# Patient Record
Sex: FEMALE | Race: WHITE | NOT HISPANIC OR LATINO | URBAN - METROPOLITAN AREA
[De-identification: names, ages, dates, MRNs, and addresses within clinical notes are randomized per-mention and may not be internally consistent; named-entity substitution may affect disease eponyms.]

---

## 2017-01-09 ENCOUNTER — INPATIENT (INPATIENT)
Facility: HOSPITAL | Age: 67
LOS: 7 days | Discharge: HOME | DRG: 193 | End: 2017-01-17
Attending: GENERAL ACUTE CARE HOSPITAL | Admitting: GENERAL ACUTE CARE HOSPITAL
Payer: MEDICARE

## 2017-01-09 VITALS
DIASTOLIC BLOOD PRESSURE: 48 MMHG | TEMPERATURE: 99 F | RESPIRATION RATE: 22 BRPM | WEIGHT: 82.89 LBS | HEART RATE: 82 BPM | HEIGHT: 58 IN | SYSTOLIC BLOOD PRESSURE: 137 MMHG | OXYGEN SATURATION: 91 %

## 2017-01-09 DIAGNOSIS — Z98.89 OTHER SPECIFIED POSTPROCEDURAL STATES: Chronic | ICD-10-CM

## 2017-01-09 DIAGNOSIS — B19.20 UNSPECIFIED VIRAL HEPATITIS C WITHOUT HEPATIC COMA: ICD-10-CM

## 2017-01-09 DIAGNOSIS — R01.1 CARDIAC MURMUR, UNSPECIFIED: ICD-10-CM

## 2017-01-09 DIAGNOSIS — Z98.1 ARTHRODESIS STATUS: Chronic | ICD-10-CM

## 2017-01-09 DIAGNOSIS — E03.9 HYPOTHYROIDISM, UNSPECIFIED: ICD-10-CM

## 2017-01-09 DIAGNOSIS — Z93.0 TRACHEOSTOMY STATUS: Chronic | ICD-10-CM

## 2017-01-09 DIAGNOSIS — J96.90 RESPIRATORY FAILURE, UNSPECIFIED, UNSPECIFIED WHETHER WITH HYPOXIA OR HYPERCAPNIA: ICD-10-CM

## 2017-01-09 DIAGNOSIS — Z41.9 ENCOUNTER FOR PROCEDURE FOR PURPOSES OTHER THAN REMEDYING HEALTH STATE, UNSPECIFIED: Chronic | ICD-10-CM

## 2017-01-09 DIAGNOSIS — D64.9 ANEMIA, UNSPECIFIED: ICD-10-CM

## 2017-01-09 DIAGNOSIS — Z98.41 CATARACT EXTRACTION STATUS, RIGHT EYE: Chronic | ICD-10-CM

## 2017-01-09 DIAGNOSIS — J18.9 PNEUMONIA, UNSPECIFIED ORGANISM: ICD-10-CM

## 2017-01-09 LAB
ALBUMIN SERPL ELPH-MCNC: 4.2 G/DL — SIGNIFICANT CHANGE UP (ref 3.3–5)
ALP SERPL-CCNC: 75 U/L — SIGNIFICANT CHANGE UP (ref 40–120)
ALT FLD-CCNC: 14 U/L RC — SIGNIFICANT CHANGE UP (ref 10–45)
ANION GAP SERPL CALC-SCNC: 12 MMOL/L — SIGNIFICANT CHANGE UP (ref 5–17)
APPEARANCE UR: CLEAR — SIGNIFICANT CHANGE UP
AST SERPL-CCNC: 20 U/L — SIGNIFICANT CHANGE UP (ref 10–40)
BASOPHILS # BLD AUTO: 0 K/UL — SIGNIFICANT CHANGE UP (ref 0–0.2)
BASOPHILS NFR BLD AUTO: 0.4 % — SIGNIFICANT CHANGE UP (ref 0–2)
BILIRUB SERPL-MCNC: 0.3 MG/DL — SIGNIFICANT CHANGE UP (ref 0.2–1.2)
BILIRUB UR-MCNC: NEGATIVE — SIGNIFICANT CHANGE UP
BUN SERPL-MCNC: 15 MG/DL — SIGNIFICANT CHANGE UP (ref 7–23)
CALCIUM SERPL-MCNC: 10.5 MG/DL — SIGNIFICANT CHANGE UP (ref 8.4–10.5)
CHLORIDE SERPL-SCNC: 94 MMOL/L — LOW (ref 96–108)
CO2 SERPL-SCNC: 37 MMOL/L — HIGH (ref 22–31)
COLOR SPEC: YELLOW — SIGNIFICANT CHANGE UP
CREAT SERPL-MCNC: 0.5 MG/DL — SIGNIFICANT CHANGE UP (ref 0.5–1.3)
DIFF PNL FLD: NEGATIVE — SIGNIFICANT CHANGE UP
EOSINOPHIL # BLD AUTO: 0.1 K/UL — SIGNIFICANT CHANGE UP (ref 0–0.5)
EOSINOPHIL NFR BLD AUTO: 1.2 % — SIGNIFICANT CHANGE UP (ref 0–6)
GAS PNL BLDV: SIGNIFICANT CHANGE UP
GLUCOSE SERPL-MCNC: 105 MG/DL — HIGH (ref 70–99)
GLUCOSE UR QL: NEGATIVE — SIGNIFICANT CHANGE UP
HCT VFR BLD CALC: 33.4 % — LOW (ref 34.5–45)
HGB BLD-MCNC: 10.8 G/DL — LOW (ref 11.5–15.5)
KETONES UR-MCNC: NEGATIVE — SIGNIFICANT CHANGE UP
LEUKOCYTE ESTERASE UR-ACNC: NEGATIVE — SIGNIFICANT CHANGE UP
LYMPHOCYTES # BLD AUTO: 0.9 K/UL — LOW (ref 1–3.3)
LYMPHOCYTES # BLD AUTO: 8.4 % — LOW (ref 13–44)
MCHC RBC-ENTMCNC: 32.5 GM/DL — SIGNIFICANT CHANGE UP (ref 32–36)
MCHC RBC-ENTMCNC: 33.1 PG — SIGNIFICANT CHANGE UP (ref 27–34)
MCV RBC AUTO: 102 FL — HIGH (ref 80–100)
MONOCYTES # BLD AUTO: 0.9 K/UL — SIGNIFICANT CHANGE UP (ref 0–0.9)
MONOCYTES NFR BLD AUTO: 9.2 % — SIGNIFICANT CHANGE UP (ref 2–14)
NEUTROPHILS # BLD AUTO: 8.3 K/UL — HIGH (ref 1.8–7.4)
NEUTROPHILS NFR BLD AUTO: 80.9 % — HIGH (ref 43–77)
NITRITE UR-MCNC: NEGATIVE — SIGNIFICANT CHANGE UP
PH UR: 8.5 — HIGH (ref 4.8–8)
PLATELET # BLD AUTO: 229 K/UL — SIGNIFICANT CHANGE UP (ref 150–400)
POTASSIUM SERPL-MCNC: 4.3 MMOL/L — SIGNIFICANT CHANGE UP (ref 3.5–5.3)
POTASSIUM SERPL-SCNC: 4.3 MMOL/L — SIGNIFICANT CHANGE UP (ref 3.5–5.3)
PROT SERPL-MCNC: 8.1 G/DL — SIGNIFICANT CHANGE UP (ref 6–8.3)
PROT UR-MCNC: 30 MG/DL
RBC # BLD: 3.27 M/UL — LOW (ref 3.8–5.2)
RBC # FLD: 11.5 % — SIGNIFICANT CHANGE UP (ref 10.3–14.5)
RBC CASTS # UR COMP ASSIST: SIGNIFICANT CHANGE UP /HPF (ref 0–2)
SODIUM SERPL-SCNC: 143 MMOL/L — SIGNIFICANT CHANGE UP (ref 135–145)
SP GR SPEC: 1.02 — SIGNIFICANT CHANGE UP (ref 1.01–1.02)
UROBILINOGEN FLD QL: 1
WBC # BLD: 10.3 K/UL — SIGNIFICANT CHANGE UP (ref 3.8–10.5)
WBC # FLD AUTO: 10.3 K/UL — SIGNIFICANT CHANGE UP (ref 3.8–10.5)
WBC UR QL: SIGNIFICANT CHANGE UP /HPF (ref 0–5)

## 2017-01-09 PROCEDURE — 71250 CT THORAX DX C-: CPT | Mod: 26

## 2017-01-09 PROCEDURE — 99285 EMERGENCY DEPT VISIT HI MDM: CPT | Mod: GC

## 2017-01-09 RX ORDER — BRIMONIDINE TARTRATE 2 MG/MG
1 SOLUTION/ DROPS OPHTHALMIC
Qty: 0 | Refills: 0 | Status: DISCONTINUED | OUTPATIENT
Start: 2017-01-09 | End: 2017-01-17

## 2017-01-09 RX ORDER — HEPARIN SODIUM 5000 [USP'U]/ML
5000 INJECTION INTRAVENOUS; SUBCUTANEOUS EVERY 8 HOURS
Qty: 0 | Refills: 0 | Status: DISCONTINUED | OUTPATIENT
Start: 2017-01-09 | End: 2017-01-13

## 2017-01-09 RX ORDER — BUDESONIDE, MICRONIZED 100 %
0.5 POWDER (GRAM) MISCELLANEOUS EVERY 12 HOURS
Qty: 0 | Refills: 0 | Status: DISCONTINUED | OUTPATIENT
Start: 2017-01-09 | End: 2017-01-17

## 2017-01-09 RX ORDER — LEVOTHYROXINE SODIUM 125 MCG
50 TABLET ORAL DAILY
Qty: 0 | Refills: 0 | Status: DISCONTINUED | OUTPATIENT
Start: 2017-01-09 | End: 2017-01-17

## 2017-01-09 RX ORDER — LATANOPROST 0.05 MG/ML
1 SOLUTION/ DROPS OPHTHALMIC; TOPICAL AT BEDTIME
Qty: 0 | Refills: 0 | Status: DISCONTINUED | OUTPATIENT
Start: 2017-01-09 | End: 2017-01-17

## 2017-01-09 RX ORDER — IPRATROPIUM/ALBUTEROL SULFATE 18-103MCG
3 AEROSOL WITH ADAPTER (GRAM) INHALATION EVERY 6 HOURS
Qty: 0 | Refills: 0 | Status: DISCONTINUED | OUTPATIENT
Start: 2017-01-09 | End: 2017-01-13

## 2017-01-09 RX ADMIN — Medication 3 MILLILITER(S): at 19:20

## 2017-01-09 NOTE — ED ADULT NURSE NOTE - PSH
H/O eye surgery  multiple for cataract and vitrectomy  H/O tracheostomy  in for about 1 month 2009 after fisrt respiratory failure  History of spinal fusion  as a child for scoliosis  S/P cataract surgery, right  2014  S/P Laparotomy  for colon cancer 2008  Surgery, other elective  multiple surgeries orthopedic as a child, due to scoliosis and club feet, and tendon surgeries all as a child

## 2017-01-09 NOTE — H&P ADULT. - PROBLEM SELECTOR PLAN 4
pt with hx of Colon ca and last colonoscopy was three yrs ago and was normal per pt   will monitor and check anemia w/u

## 2017-01-09 NOTE — ED ADULT NURSE NOTE - PMH
Adult Hypothyroidism    AF (Paroxysmal Atrial Fibrillation)  on episode, no treatment  Arthrogryposis multiplex congenita    Cataract    Colon cancer  dx 2009  Congenital Scoliosis    COPD (Chronic Obstructive Pulmonary Disease)  restrictive secondary to scoliosis  Hepatitis C  not yet been treated,treated 2015  Osteoporosis    Pneumonia  MRSA 2010  Respiratory failure  was on a Vent for 1 month in 2009  Scoliosis

## 2017-01-09 NOTE — ED PROVIDER NOTE - OBJECTIVE STATEMENT
66F with restrictive lung disease, scoliosis, chronic O2 use (unclear reason), who presents with 1 week of SOB, increased work of breathing, and chest discomfort. No fevers/night sweats. Patient visited her Pulmonologist today (Dr. Bass), who sent her in for further evaluation.

## 2017-01-09 NOTE — ED PROVIDER NOTE - MEDICAL DECISION MAKING DETAILS
1) Labs 2) CT chest 3) IV abx as needed 4) DMIT TO ITA 66F with restrictive lung disease a/w increased work of breathing. 1) Labs 2) CT chest 3) IV abx as needed 4) DMIT TO ITA

## 2017-01-09 NOTE — H&P ADULT. - ASSESSMENT
65 y/o female with hx of colon ca s/p J pouch , hypothyroid , arthyrogryprosis,  restrictive lung diease sec to kyphoscolosis chronically on home nocturnal o2 but occasionally during day  , hitory of respiratory failure in past s/p tach and peg . pt now admitted with worsening sob and cough / back pain and increasing use of O2 to needing it during dya  . no fever or chills   CXR done as o/p suggestive of pna   pt was seen in ER by Dr. Brooks from pul :  acute on chronic respiratory failure ( sec to restrictive lung diseae ) with decompensation  sec to PNA

## 2017-01-09 NOTE — H&P ADULT. - PMH
Adult Hypothyroidism    AF (Paroxysmal Atrial Fibrillation)  on episode, no treatment  Arthrogryposis multiplex congenita    Cataract    Colon cancer  dx 2009  Congenital Scoliosis    COPD (Chronic Obstructive Pulmonary Disease)  restrictive secondary to scoliosis  Hepatitis C  not yet been treated,treated 2015  Osteoporosis    Pneumonia  MRSA 2010  Respiratory failure  was on a Vent for 1 month in 2009  Scoliosis Adult Hypothyroidism    Arthrogryposis multiplex congenita    Cataract    Colon cancer  dx 2009  Congenital Scoliosis    COPD (Chronic Obstructive Pulmonary Disease)  restrictive secondary to scoliosis  Osteoporosis    Pneumonia  MRSA 2010  Respiratory failure  was on a Vent for 1 month in 2009  Scoliosis

## 2017-01-09 NOTE — H&P ADULT. - PROBLEM SELECTOR PLAN 1
acute on chronic respiratory failure ( sec to restrictive lung diseae ) with decompensation  sec to PNA      given one dose of Levaquin .. will await ID input given multiple allergies   pt unsure if allergies documented are true allergies

## 2017-01-09 NOTE — ED ADULT NURSE NOTE - OBJECTIVE STATEMENT
pt is a 66 yr F sent by PMD for PNA, pt has been having worsening SOB and cough since Saturday. denies fever/chills pt is a 66 yr F sent by PMD for PNA, pt has been having worsening SOB and cough since Saturday. denies fever/chills/cp. 90% on 2L NC, 95% on 3L. no acute distress.

## 2017-01-09 NOTE — ED PROVIDER NOTE - PLAN OF CARE
Undergo further work-up and management - patient to be admitted to Dr. Moreland  - continue with Moustapha

## 2017-01-09 NOTE — ED PROVIDER NOTE - CARE PLAN
Principal Discharge DX:	Community acquired pneumonia  Goal:	Undergo further work-up and management  Instructions for follow-up, activity and diet:	- patient to be admitted to Dr. Moreland  - continue with Moustapha

## 2017-01-10 LAB
ALBUMIN SERPL ELPH-MCNC: 3.7 G/DL — SIGNIFICANT CHANGE UP (ref 3.3–5)
ALP SERPL-CCNC: 69 U/L — SIGNIFICANT CHANGE UP (ref 40–120)
ALT FLD-CCNC: 19 U/L — SIGNIFICANT CHANGE UP (ref 10–45)
ANION GAP SERPL CALC-SCNC: 12 MMOL/L — SIGNIFICANT CHANGE UP (ref 5–17)
AST SERPL-CCNC: 23 U/L — SIGNIFICANT CHANGE UP (ref 10–40)
BILIRUB SERPL-MCNC: 0.2 MG/DL — SIGNIFICANT CHANGE UP (ref 0.2–1.2)
BUN SERPL-MCNC: 16 MG/DL — SIGNIFICANT CHANGE UP (ref 7–23)
CALCIUM SERPL-MCNC: 9.9 MG/DL — SIGNIFICANT CHANGE UP (ref 8.4–10.5)
CHLORIDE SERPL-SCNC: 93 MMOL/L — LOW (ref 96–108)
CO2 SERPL-SCNC: 36 MMOL/L — HIGH (ref 22–31)
CREAT SERPL-MCNC: 0.42 MG/DL — LOW (ref 0.5–1.3)
FERRITIN SERPL-MCNC: 185.7 NG/ML — HIGH (ref 15–150)
FOLATE SERPL-MCNC: >20 NG/ML — SIGNIFICANT CHANGE UP (ref 4.8–24.2)
GLUCOSE SERPL-MCNC: 103 MG/DL — HIGH (ref 70–99)
IRON SATN MFR SERPL: 15 % — SIGNIFICANT CHANGE UP (ref 14–50)
IRON SATN MFR SERPL: 33 UG/DL — SIGNIFICANT CHANGE UP (ref 30–160)
LEGIONELLA AG UR QL: NEGATIVE — SIGNIFICANT CHANGE UP
POTASSIUM SERPL-MCNC: 5.3 MMOL/L — SIGNIFICANT CHANGE UP (ref 3.5–5.3)
POTASSIUM SERPL-SCNC: 5.3 MMOL/L — SIGNIFICANT CHANGE UP (ref 3.5–5.3)
PROT SERPL-MCNC: 7.4 G/DL — SIGNIFICANT CHANGE UP (ref 6–8.3)
RAPID RVP RESULT: SIGNIFICANT CHANGE UP
SODIUM SERPL-SCNC: 141 MMOL/L — SIGNIFICANT CHANGE UP (ref 135–145)
T4 FREE SERPL-MCNC: 1.2 NG/DL — SIGNIFICANT CHANGE UP (ref 0.9–1.8)
TIBC SERPL-MCNC: 217 UG/DL — LOW (ref 220–430)
TSH SERPL-MCNC: 2.39 UU/ML — SIGNIFICANT CHANGE UP (ref 0.27–4.2)
UIBC SERPL-MCNC: 184 UG/DL — SIGNIFICANT CHANGE UP (ref 110–370)
VIT B12 SERPL-MCNC: 296 PG/ML — SIGNIFICANT CHANGE UP (ref 243–894)

## 2017-01-10 PROCEDURE — 99222 1ST HOSP IP/OBS MODERATE 55: CPT

## 2017-01-10 RX ORDER — IBUPROFEN 200 MG
400 TABLET ORAL ONCE
Qty: 0 | Refills: 0 | Status: COMPLETED | OUTPATIENT
Start: 2017-01-10 | End: 2017-01-10

## 2017-01-10 RX ADMIN — HEPARIN SODIUM 5000 UNIT(S): 5000 INJECTION INTRAVENOUS; SUBCUTANEOUS at 22:19

## 2017-01-10 RX ADMIN — HEPARIN SODIUM 5000 UNIT(S): 5000 INJECTION INTRAVENOUS; SUBCUTANEOUS at 05:03

## 2017-01-10 RX ADMIN — Medication 0.5 MILLIGRAM(S): at 17:34

## 2017-01-10 RX ADMIN — BRIMONIDINE TARTRATE 1 DROP(S): 2 SOLUTION/ DROPS OPHTHALMIC at 17:34

## 2017-01-10 RX ADMIN — Medication 3 MILLILITER(S): at 17:33

## 2017-01-10 RX ADMIN — Medication 3 MILLILITER(S): at 00:54

## 2017-01-10 RX ADMIN — Medication 400 MILLIGRAM(S): at 14:06

## 2017-01-10 RX ADMIN — LATANOPROST 1 DROP(S): 0.05 SOLUTION/ DROPS OPHTHALMIC; TOPICAL at 22:20

## 2017-01-10 RX ADMIN — Medication 3 MILLILITER(S): at 23:22

## 2017-01-10 RX ADMIN — Medication 1 TABLET(S): at 11:24

## 2017-01-10 RX ADMIN — Medication 50 MICROGRAM(S): at 05:03

## 2017-01-10 RX ADMIN — Medication 400 MILLIGRAM(S): at 13:36

## 2017-01-10 RX ADMIN — Medication 3 MILLILITER(S): at 11:24

## 2017-01-10 RX ADMIN — Medication 3 MILLILITER(S): at 05:03

## 2017-01-10 RX ADMIN — BRIMONIDINE TARTRATE 1 DROP(S): 2 SOLUTION/ DROPS OPHTHALMIC at 05:09

## 2017-01-10 RX ADMIN — Medication 0.5 MILLIGRAM(S): at 11:25

## 2017-01-11 LAB
ANION GAP SERPL CALC-SCNC: 10 MMOL/L — SIGNIFICANT CHANGE UP (ref 5–17)
BUN SERPL-MCNC: 12 MG/DL — SIGNIFICANT CHANGE UP (ref 7–23)
CALCIUM SERPL-MCNC: 9.8 MG/DL — SIGNIFICANT CHANGE UP (ref 8.4–10.5)
CHLORIDE SERPL-SCNC: 94 MMOL/L — LOW (ref 96–108)
CK MB BLD-MCNC: 5.4 % — HIGH (ref 0–3.5)
CK MB BLD-MCNC: 5.8 % — HIGH (ref 0–3.5)
CK MB CFR SERPL CALC: 1.5 NG/ML — SIGNIFICANT CHANGE UP (ref 0–3.8)
CK MB CFR SERPL CALC: 1.5 NG/ML — SIGNIFICANT CHANGE UP (ref 0–3.8)
CK SERPL-CCNC: 26 U/L — SIGNIFICANT CHANGE UP (ref 25–170)
CK SERPL-CCNC: 28 U/L — SIGNIFICANT CHANGE UP (ref 25–170)
CO2 SERPL-SCNC: 38 MMOL/L — HIGH (ref 22–31)
CREAT SERPL-MCNC: 0.41 MG/DL — LOW (ref 0.5–1.3)
GLUCOSE SERPL-MCNC: 121 MG/DL — HIGH (ref 70–99)
HCT VFR BLD CALC: 33.2 % — LOW (ref 34.5–45)
HGB BLD-MCNC: 10 G/DL — LOW (ref 11.5–15.5)
MCHC RBC-ENTMCNC: 30.1 GM/DL — LOW (ref 32–36)
MCHC RBC-ENTMCNC: 31.2 PG — SIGNIFICANT CHANGE UP (ref 27–34)
MCV RBC AUTO: 103.4 FL — HIGH (ref 80–100)
PLATELET # BLD AUTO: 255 K/UL — SIGNIFICANT CHANGE UP (ref 150–400)
POTASSIUM SERPL-MCNC: 4.5 MMOL/L — SIGNIFICANT CHANGE UP (ref 3.5–5.3)
POTASSIUM SERPL-SCNC: 4.5 MMOL/L — SIGNIFICANT CHANGE UP (ref 3.5–5.3)
RBC # BLD: 3.21 M/UL — LOW (ref 3.8–5.2)
RBC # FLD: 12.7 % — SIGNIFICANT CHANGE UP (ref 10.3–14.5)
SODIUM SERPL-SCNC: 142 MMOL/L — SIGNIFICANT CHANGE UP (ref 135–145)
TROPONIN T SERPL-MCNC: <0.01 NG/ML — SIGNIFICANT CHANGE UP (ref 0–0.06)
TROPONIN T SERPL-MCNC: <0.01 NG/ML — SIGNIFICANT CHANGE UP (ref 0–0.06)
WBC # BLD: 8.09 K/UL — SIGNIFICANT CHANGE UP (ref 3.8–10.5)
WBC # FLD AUTO: 8.09 K/UL — SIGNIFICANT CHANGE UP (ref 3.8–10.5)

## 2017-01-11 PROCEDURE — 93010 ELECTROCARDIOGRAM REPORT: CPT | Mod: 76

## 2017-01-11 PROCEDURE — 93306 TTE W/DOPPLER COMPLETE: CPT | Mod: 26

## 2017-01-11 PROCEDURE — 99232 SBSQ HOSP IP/OBS MODERATE 35: CPT

## 2017-01-11 PROCEDURE — 99223 1ST HOSP IP/OBS HIGH 75: CPT

## 2017-01-11 RX ORDER — ASPIRIN/CALCIUM CARB/MAGNESIUM 324 MG
81 TABLET ORAL DAILY
Qty: 0 | Refills: 0 | Status: DISCONTINUED | OUTPATIENT
Start: 2017-01-11 | End: 2017-01-13

## 2017-01-11 RX ORDER — METOPROLOL TARTRATE 50 MG
25 TABLET ORAL
Qty: 0 | Refills: 0 | Status: DISCONTINUED | OUTPATIENT
Start: 2017-01-11 | End: 2017-01-11

## 2017-01-11 RX ORDER — IBUPROFEN 200 MG
400 TABLET ORAL ONCE
Qty: 0 | Refills: 0 | Status: COMPLETED | OUTPATIENT
Start: 2017-01-11 | End: 2017-01-11

## 2017-01-11 RX ORDER — METOPROLOL TARTRATE 50 MG
2.5 TABLET ORAL ONCE
Qty: 0 | Refills: 0 | Status: COMPLETED | OUTPATIENT
Start: 2017-01-11 | End: 2017-01-11

## 2017-01-11 RX ADMIN — LATANOPROST 1 DROP(S): 0.05 SOLUTION/ DROPS OPHTHALMIC; TOPICAL at 21:01

## 2017-01-11 RX ADMIN — HEPARIN SODIUM 5000 UNIT(S): 5000 INJECTION INTRAVENOUS; SUBCUTANEOUS at 08:44

## 2017-01-11 RX ADMIN — Medication 400 MILLIGRAM(S): at 02:26

## 2017-01-11 RX ADMIN — Medication 2.5 MILLIGRAM(S): at 05:54

## 2017-01-11 RX ADMIN — Medication 0.5 MILLIGRAM(S): at 18:22

## 2017-01-11 RX ADMIN — Medication 3 MILLILITER(S): at 05:55

## 2017-01-11 RX ADMIN — HEPARIN SODIUM 5000 UNIT(S): 5000 INJECTION INTRAVENOUS; SUBCUTANEOUS at 13:18

## 2017-01-11 RX ADMIN — Medication 50 MICROGRAM(S): at 08:40

## 2017-01-11 RX ADMIN — Medication 3 MILLILITER(S): at 13:10

## 2017-01-11 RX ADMIN — Medication 1 TABLET(S): at 13:18

## 2017-01-11 RX ADMIN — BRIMONIDINE TARTRATE 1 DROP(S): 2 SOLUTION/ DROPS OPHTHALMIC at 08:45

## 2017-01-11 RX ADMIN — Medication 25 MILLIGRAM(S): at 18:23

## 2017-01-11 RX ADMIN — Medication 400 MILLIGRAM(S): at 02:28

## 2017-01-11 RX ADMIN — Medication 0.5 MILLIGRAM(S): at 05:54

## 2017-01-11 RX ADMIN — Medication 3 MILLILITER(S): at 18:21

## 2017-01-11 RX ADMIN — Medication 81 MILLIGRAM(S): at 13:18

## 2017-01-11 RX ADMIN — HEPARIN SODIUM 5000 UNIT(S): 5000 INJECTION INTRAVENOUS; SUBCUTANEOUS at 21:01

## 2017-01-11 RX ADMIN — BRIMONIDINE TARTRATE 1 DROP(S): 2 SOLUTION/ DROPS OPHTHALMIC at 18:23

## 2017-01-11 NOTE — PROVIDER CONTACT NOTE (CHANGE IN STATUS NOTIFICATION) - SITUATION
Called kanika Malagon for patient with complaints of chest tightness ,sob , elevated hr 147 , Called kanika Malagon for patient with complaints of chest tightness ,sob , elevated hr 147 , mews score of 8

## 2017-01-11 NOTE — PROVIDER CONTACT NOTE (CHANGE IN STATUS NOTIFICATION) - ACTION/TREATMENT ORDERED:
kanika Malagon assessed patient administered lopressor  2.5 mg ivp /1 , patient pending transfer to telemetry  as per kanika Malagon instructions

## 2017-01-12 LAB
CK MB BLD-MCNC: 7 % — HIGH (ref 0–3.5)
CK MB CFR SERPL CALC: 1.4 NG/ML — SIGNIFICANT CHANGE UP (ref 0–3.8)
CK SERPL-CCNC: 20 U/L — LOW (ref 25–170)
TROPONIN T SERPL-MCNC: <0.01 NG/ML — SIGNIFICANT CHANGE UP (ref 0–0.06)

## 2017-01-12 PROCEDURE — 99233 SBSQ HOSP IP/OBS HIGH 50: CPT

## 2017-01-12 PROCEDURE — 99232 SBSQ HOSP IP/OBS MODERATE 35: CPT

## 2017-01-12 RX ADMIN — BRIMONIDINE TARTRATE 1 DROP(S): 2 SOLUTION/ DROPS OPHTHALMIC at 17:48

## 2017-01-12 RX ADMIN — Medication 1 DROP(S): at 20:37

## 2017-01-12 RX ADMIN — Medication 1 DROP(S): at 17:48

## 2017-01-12 RX ADMIN — Medication 1 TABLET(S): at 11:52

## 2017-01-12 RX ADMIN — Medication 3 MILLILITER(S): at 11:52

## 2017-01-12 RX ADMIN — HEPARIN SODIUM 5000 UNIT(S): 5000 INJECTION INTRAVENOUS; SUBCUTANEOUS at 14:47

## 2017-01-12 RX ADMIN — Medication 3 MILLILITER(S): at 00:58

## 2017-01-12 RX ADMIN — Medication 1 DROP(S): at 22:38

## 2017-01-12 RX ADMIN — Medication 0.5 MILLIGRAM(S): at 17:48

## 2017-01-12 RX ADMIN — Medication 0.5 MILLIGRAM(S): at 06:03

## 2017-01-12 RX ADMIN — Medication 3 MILLILITER(S): at 06:00

## 2017-01-12 RX ADMIN — BRIMONIDINE TARTRATE 1 DROP(S): 2 SOLUTION/ DROPS OPHTHALMIC at 06:00

## 2017-01-12 RX ADMIN — Medication 3 MILLILITER(S): at 17:48

## 2017-01-12 RX ADMIN — HEPARIN SODIUM 5000 UNIT(S): 5000 INJECTION INTRAVENOUS; SUBCUTANEOUS at 22:38

## 2017-01-12 RX ADMIN — LATANOPROST 1 DROP(S): 0.05 SOLUTION/ DROPS OPHTHALMIC; TOPICAL at 21:38

## 2017-01-12 RX ADMIN — HEPARIN SODIUM 5000 UNIT(S): 5000 INJECTION INTRAVENOUS; SUBCUTANEOUS at 06:00

## 2017-01-12 RX ADMIN — Medication 50 MICROGRAM(S): at 06:02

## 2017-01-12 RX ADMIN — Medication 81 MILLIGRAM(S): at 11:52

## 2017-01-12 RX ADMIN — Medication 3 MILLILITER(S): at 22:39

## 2017-01-12 RX ADMIN — Medication 1 DROP(S): at 14:47

## 2017-01-12 NOTE — DIETITIAN INITIAL EVALUATION ADULT. - NS AS NUTRI INTERV ED CONTENT
Other (specify)/Reviewed Increased nutrient need with Pt. Encouraged Pt to consume nutrient dense meals/ snacks, small frequent meals/ snacks. Pt verbalized understanding.

## 2017-01-12 NOTE — DIETITIAN INITIAL EVALUATION ADULT. - OTHER INFO
Pt seen fr BMI <19. Pt reports a good PO intake during admission; denies chewing/swallowing difficulty. Pt reports PTA she eats well, however of the past 2 weeks she hasn't been feeling well and therefore was eating ~50% of her usual intake. Pt is currently 81.1lbs. Pt states she has always been thin but has noticed a thinner appearance in the last 2 weeks. Pt refusing Ensure products at this time but is amenable to trying No carb Pro source. Pt offer foods preferences and is requested a lactose free diet. Pt denies GI distress at this time; states stool in J Pouch is normal. Pt takes MVI. Noted allergy to honey.

## 2017-01-12 NOTE — DIETITIAN INITIAL EVALUATION ADULT. - NS AS NUTRI INTERV COLLABORAT
Collaboration with other providers/Malnutrition sticker placed in chart, Team aware. RD remains available to monitor PO intake, wt, labs and intake of PO supplement.

## 2017-01-12 NOTE — DIETITIAN INITIAL EVALUATION ADULT. - PHYSICAL APPEARANCE
emaciated/Nutrition physical focused exam: Severe muscle wasting to temporals, clavicles, deltoids, scapulas, Interosseous region, thigh/calf. Severe fat loss to orbitals ribs and triceps.

## 2017-01-12 NOTE — DIETITIAN INITIAL EVALUATION ADULT. - NS AS NUTRI INTERV FEED ASSISTANCE
1. Provide food preferences as requested by Pt/family within diet restrictions  2. Encourage PO intake during meal times 3. Reviewed menu ordering procedures/Other (specify)

## 2017-01-12 NOTE — DIETITIAN INITIAL EVALUATION ADULT. - ORAL INTAKE PTA
Pt reports a good appetite PTA. Breakfast: cereal, oatmeal, eggs on weekens, or bagel with cheese. Lunch: tuna or egg salad. Lactaid free yogurt. Dinner: baked chicken, roasted vegetables, egg noodles, sweet potato or rice. Drinks: water and coffee./good

## 2017-01-12 NOTE — DIETITIAN INITIAL EVALUATION ADULT. - NUTRITION INTERVENTION
Feeding Assistance/Medical Food Supplements/Meals and Snack/Collaboration and Referral of Nutrition Care/Nutrition Education

## 2017-01-12 NOTE — DIETITIAN INITIAL EVALUATION ADULT. - ENERGY NEEDS
Ht: 4'10", Wt: 81.1lbs, BMI: 16.9kg/m2, IBW: 96lbs(+/-10%), 84%IBW  Pertinent information: Pt admitted for worsening hypoxia. Pt with restrictive lung Dz due to Kyphoscoliosis. per chart, CT chest shows PNA, and new on set AFib.   +1 right foot Edema. No Skin breakdown noted at this time.

## 2017-01-13 LAB
ALBUMIN SERPL ELPH-MCNC: 3.6 G/DL — SIGNIFICANT CHANGE UP (ref 3.3–5)
ALP SERPL-CCNC: 77 U/L — SIGNIFICANT CHANGE UP (ref 40–120)
ALT FLD-CCNC: 24 U/L RC — SIGNIFICANT CHANGE UP (ref 10–45)
ANION GAP SERPL CALC-SCNC: 10 MMOL/L — SIGNIFICANT CHANGE UP (ref 5–17)
ANION GAP SERPL CALC-SCNC: 11 MMOL/L — SIGNIFICANT CHANGE UP (ref 5–17)
AST SERPL-CCNC: 33 U/L — SIGNIFICANT CHANGE UP (ref 10–40)
BASOPHILS # BLD AUTO: 0 K/UL — SIGNIFICANT CHANGE UP (ref 0–0.2)
BASOPHILS NFR BLD AUTO: 0.2 % — SIGNIFICANT CHANGE UP (ref 0–2)
BILIRUB SERPL-MCNC: 0.1 MG/DL — LOW (ref 0.2–1.2)
BUN SERPL-MCNC: 19 MG/DL — SIGNIFICANT CHANGE UP (ref 7–23)
BUN SERPL-MCNC: 22 MG/DL — SIGNIFICANT CHANGE UP (ref 7–23)
CALCIUM SERPL-MCNC: 10.1 MG/DL — SIGNIFICANT CHANGE UP (ref 8.4–10.5)
CALCIUM SERPL-MCNC: 8.9 MG/DL — SIGNIFICANT CHANGE UP (ref 8.4–10.5)
CHLORIDE SERPL-SCNC: 100 MMOL/L — SIGNIFICANT CHANGE UP (ref 96–108)
CHLORIDE SERPL-SCNC: 94 MMOL/L — LOW (ref 96–108)
CO2 SERPL-SCNC: 33 MMOL/L — HIGH (ref 22–31)
CO2 SERPL-SCNC: 38 MMOL/L — HIGH (ref 22–31)
CREAT SERPL-MCNC: 0.36 MG/DL — LOW (ref 0.5–1.3)
CREAT SERPL-MCNC: 0.47 MG/DL — LOW (ref 0.5–1.3)
EOSINOPHIL # BLD AUTO: 0.1 K/UL — SIGNIFICANT CHANGE UP (ref 0–0.5)
EOSINOPHIL NFR BLD AUTO: 1.6 % — SIGNIFICANT CHANGE UP (ref 0–6)
GAS PNL BLDA: SIGNIFICANT CHANGE UP
GLUCOSE SERPL-MCNC: 111 MG/DL — HIGH (ref 70–99)
GLUCOSE SERPL-MCNC: 117 MG/DL — HIGH (ref 70–99)
HCT VFR BLD CALC: 32.4 % — LOW (ref 34.5–45)
HGB BLD-MCNC: 10.5 G/DL — LOW (ref 11.5–15.5)
LYMPHOCYTES # BLD AUTO: 1.3 K/UL — SIGNIFICANT CHANGE UP (ref 1–3.3)
LYMPHOCYTES # BLD AUTO: 14.5 % — SIGNIFICANT CHANGE UP (ref 13–44)
MAGNESIUM SERPL-MCNC: 1.9 MG/DL — SIGNIFICANT CHANGE UP (ref 1.6–2.6)
MCHC RBC-ENTMCNC: 32.4 GM/DL — SIGNIFICANT CHANGE UP (ref 32–36)
MCHC RBC-ENTMCNC: 33.7 PG — SIGNIFICANT CHANGE UP (ref 27–34)
MCV RBC AUTO: 104 FL — HIGH (ref 80–100)
MONOCYTES # BLD AUTO: 1.1 K/UL — HIGH (ref 0–0.9)
MONOCYTES NFR BLD AUTO: 12.2 % — SIGNIFICANT CHANGE UP (ref 2–14)
NEUTROPHILS # BLD AUTO: 6.6 K/UL — SIGNIFICANT CHANGE UP (ref 1.8–7.4)
NEUTROPHILS NFR BLD AUTO: 71.6 % — SIGNIFICANT CHANGE UP (ref 43–77)
PHOSPHATE SERPL-MCNC: 3.9 MG/DL — SIGNIFICANT CHANGE UP (ref 2.5–4.5)
PLATELET # BLD AUTO: 240 K/UL — SIGNIFICANT CHANGE UP (ref 150–400)
POTASSIUM SERPL-MCNC: 4.6 MMOL/L — SIGNIFICANT CHANGE UP (ref 3.5–5.3)
POTASSIUM SERPL-MCNC: 5.1 MMOL/L — SIGNIFICANT CHANGE UP (ref 3.5–5.3)
POTASSIUM SERPL-SCNC: 4.6 MMOL/L — SIGNIFICANT CHANGE UP (ref 3.5–5.3)
POTASSIUM SERPL-SCNC: 5.1 MMOL/L — SIGNIFICANT CHANGE UP (ref 3.5–5.3)
PROT SERPL-MCNC: 7.5 G/DL — SIGNIFICANT CHANGE UP (ref 6–8.3)
RBC # BLD: 3.11 M/UL — LOW (ref 3.8–5.2)
RBC # FLD: 12.1 % — SIGNIFICANT CHANGE UP (ref 10.3–14.5)
SODIUM SERPL-SCNC: 143 MMOL/L — SIGNIFICANT CHANGE UP (ref 135–145)
SODIUM SERPL-SCNC: 143 MMOL/L — SIGNIFICANT CHANGE UP (ref 135–145)
WBC # BLD: 9.3 K/UL — SIGNIFICANT CHANGE UP (ref 3.8–10.5)
WBC # FLD AUTO: 9.3 K/UL — SIGNIFICANT CHANGE UP (ref 3.8–10.5)

## 2017-01-13 PROCEDURE — 93970 EXTREMITY STUDY: CPT | Mod: 26

## 2017-01-13 PROCEDURE — 99233 SBSQ HOSP IP/OBS HIGH 50: CPT

## 2017-01-13 PROCEDURE — 99291 CRITICAL CARE FIRST HOUR: CPT

## 2017-01-13 PROCEDURE — 71010: CPT | Mod: 26

## 2017-01-13 PROCEDURE — 71010: CPT | Mod: 26,77

## 2017-01-13 RX ORDER — FUROSEMIDE 40 MG
20 TABLET ORAL ONCE
Qty: 0 | Refills: 0 | Status: COMPLETED | OUTPATIENT
Start: 2017-01-13 | End: 2017-01-13

## 2017-01-13 RX ORDER — METOPROLOL TARTRATE 50 MG
5 TABLET ORAL ONCE
Qty: 0 | Refills: 0 | Status: COMPLETED | OUTPATIENT
Start: 2017-01-13 | End: 2017-01-13

## 2017-01-13 RX ORDER — SODIUM CHLORIDE 9 MG/ML
1000 INJECTION INTRAMUSCULAR; INTRAVENOUS; SUBCUTANEOUS ONCE
Qty: 0 | Refills: 0 | Status: COMPLETED | OUTPATIENT
Start: 2017-01-13 | End: 2017-01-13

## 2017-01-13 RX ORDER — SODIUM CHLORIDE 9 MG/ML
1000 INJECTION INTRAMUSCULAR; INTRAVENOUS; SUBCUTANEOUS
Qty: 0 | Refills: 0 | Status: DISCONTINUED | OUTPATIENT
Start: 2017-01-13 | End: 2017-01-17

## 2017-01-13 RX ORDER — APIXABAN 2.5 MG/1
5 TABLET, FILM COATED ORAL
Qty: 0 | Refills: 0 | Status: DISCONTINUED | OUTPATIENT
Start: 2017-01-13 | End: 2017-01-17

## 2017-01-13 RX ORDER — IPRATROPIUM BROMIDE 0.2 MG/ML
500 SOLUTION, NON-ORAL INHALATION EVERY 6 HOURS
Qty: 0 | Refills: 0 | Status: DISCONTINUED | OUTPATIENT
Start: 2017-01-13 | End: 2017-01-17

## 2017-01-13 RX ADMIN — Medication 500 MICROGRAM(S): at 15:11

## 2017-01-13 RX ADMIN — Medication 1 DROP(S): at 00:19

## 2017-01-13 RX ADMIN — Medication 1 DROP(S): at 15:12

## 2017-01-13 RX ADMIN — Medication 500 MICROGRAM(S): at 17:57

## 2017-01-13 RX ADMIN — Medication 5 MILLIGRAM(S): at 01:36

## 2017-01-13 RX ADMIN — Medication 5 MILLIGRAM(S): at 01:59

## 2017-01-13 RX ADMIN — HEPARIN SODIUM 5000 UNIT(S): 5000 INJECTION INTRAVENOUS; SUBCUTANEOUS at 06:48

## 2017-01-13 RX ADMIN — Medication 500 MICROGRAM(S): at 23:08

## 2017-01-13 RX ADMIN — BRIMONIDINE TARTRATE 1 DROP(S): 2 SOLUTION/ DROPS OPHTHALMIC at 06:46

## 2017-01-13 RX ADMIN — Medication 1 DROP(S): at 21:28

## 2017-01-13 RX ADMIN — Medication 1 DROP(S): at 06:47

## 2017-01-13 RX ADMIN — Medication 0.5 MILLIGRAM(S): at 06:46

## 2017-01-13 RX ADMIN — Medication 3 MILLILITER(S): at 05:02

## 2017-01-13 RX ADMIN — Medication 50 MICROGRAM(S): at 06:48

## 2017-01-13 RX ADMIN — Medication 1 DROP(S): at 11:31

## 2017-01-13 RX ADMIN — Medication 3 MILLILITER(S): at 11:25

## 2017-01-13 RX ADMIN — LATANOPROST 1 DROP(S): 0.05 SOLUTION/ DROPS OPHTHALMIC; TOPICAL at 21:27

## 2017-01-13 RX ADMIN — Medication 0.5 MILLIGRAM(S): at 17:57

## 2017-01-13 RX ADMIN — SODIUM CHLORIDE 2000 MILLILITER(S): 9 INJECTION INTRAMUSCULAR; INTRAVENOUS; SUBCUTANEOUS at 02:30

## 2017-01-13 RX ADMIN — Medication 1 DROP(S): at 17:53

## 2017-01-13 RX ADMIN — SODIUM CHLORIDE 2000 MILLILITER(S): 9 INJECTION INTRAMUSCULAR; INTRAVENOUS; SUBCUTANEOUS at 03:59

## 2017-01-13 RX ADMIN — APIXABAN 5 MILLIGRAM(S): 2.5 TABLET, FILM COATED ORAL at 17:55

## 2017-01-13 RX ADMIN — Medication 1 TABLET(S): at 11:30

## 2017-01-13 RX ADMIN — Medication 1 DROP(S): at 23:09

## 2017-01-13 RX ADMIN — Medication 20 MILLIGRAM(S): at 11:21

## 2017-01-13 RX ADMIN — BRIMONIDINE TARTRATE 1 DROP(S): 2 SOLUTION/ DROPS OPHTHALMIC at 17:56

## 2017-01-13 RX ADMIN — Medication 81 MILLIGRAM(S): at 11:30

## 2017-01-14 PROCEDURE — 93010 ELECTROCARDIOGRAM REPORT: CPT

## 2017-01-14 PROCEDURE — 99232 SBSQ HOSP IP/OBS MODERATE 35: CPT

## 2017-01-14 RX ORDER — DILTIAZEM HCL 120 MG
120 CAPSULE, EXT RELEASE 24 HR ORAL DAILY
Qty: 0 | Refills: 0 | Status: DISCONTINUED | OUTPATIENT
Start: 2017-01-15 | End: 2017-01-17

## 2017-01-14 RX ADMIN — BRIMONIDINE TARTRATE 1 DROP(S): 2 SOLUTION/ DROPS OPHTHALMIC at 18:07

## 2017-01-14 RX ADMIN — Medication 1 DROP(S): at 12:18

## 2017-01-14 RX ADMIN — APIXABAN 5 MILLIGRAM(S): 2.5 TABLET, FILM COATED ORAL at 18:07

## 2017-01-14 RX ADMIN — Medication 0.5 MILLIGRAM(S): at 18:07

## 2017-01-14 RX ADMIN — Medication 1 DROP(S): at 03:14

## 2017-01-14 RX ADMIN — Medication 1 DROP(S): at 16:12

## 2017-01-14 RX ADMIN — Medication 1 DROP(S): at 22:00

## 2017-01-14 RX ADMIN — LATANOPROST 1 DROP(S): 0.05 SOLUTION/ DROPS OPHTHALMIC; TOPICAL at 22:15

## 2017-01-14 RX ADMIN — Medication 1 DROP(S): at 05:40

## 2017-01-14 RX ADMIN — Medication 1 DROP(S): at 20:00

## 2017-01-14 RX ADMIN — Medication 500 MICROGRAM(S): at 18:07

## 2017-01-14 RX ADMIN — Medication 1 DROP(S): at 18:07

## 2017-01-14 RX ADMIN — Medication 1 DROP(S): at 02:30

## 2017-01-14 RX ADMIN — APIXABAN 5 MILLIGRAM(S): 2.5 TABLET, FILM COATED ORAL at 05:39

## 2017-01-14 RX ADMIN — Medication 1 DROP(S): at 08:10

## 2017-01-14 RX ADMIN — Medication 1 TABLET(S): at 11:09

## 2017-01-14 RX ADMIN — Medication 0.5 MILLIGRAM(S): at 05:40

## 2017-01-14 RX ADMIN — Medication 500 MICROGRAM(S): at 05:40

## 2017-01-14 RX ADMIN — Medication 500 MICROGRAM(S): at 11:08

## 2017-01-14 RX ADMIN — BRIMONIDINE TARTRATE 1 DROP(S): 2 SOLUTION/ DROPS OPHTHALMIC at 05:40

## 2017-01-14 RX ADMIN — Medication 1 DROP(S): at 14:22

## 2017-01-14 RX ADMIN — Medication 50 MICROGRAM(S): at 05:39

## 2017-01-14 RX ADMIN — Medication 1 DROP(S): at 10:46

## 2017-01-14 NOTE — PHYSICAL THERAPY INITIAL EVALUATION ADULT - PERTINENT HX OF CURRENT PROBLEM, REHAB EVAL
Pt is a 65 y/o female with hx of colon ca s/p J pouch , hypothyroidism , arthrogryposis,  restrictive lung diease sec to kyphoscolosis chronically on home nocturnal O2 but occasionally during day, history of respiratory failure in past s/p tach and peg . Pt now admitted with worsening sob and cough / back pain and increasing use of O2 to needing it during day. no fever or chills. Chest X-Ray displaying LLL opacity consistent with atelectasis

## 2017-01-15 LAB
ALBUMIN SERPL ELPH-MCNC: 3.4 G/DL — SIGNIFICANT CHANGE UP (ref 3.3–5)
ALP SERPL-CCNC: 74 U/L — SIGNIFICANT CHANGE UP (ref 40–120)
ALT FLD-CCNC: 24 U/L RC — SIGNIFICANT CHANGE UP (ref 10–45)
ANION GAP SERPL CALC-SCNC: 8 MMOL/L — SIGNIFICANT CHANGE UP (ref 5–17)
AST SERPL-CCNC: 20 U/L — SIGNIFICANT CHANGE UP (ref 10–40)
BASE EXCESS BLDA CALC-SCNC: 17.2 MMOL/L — HIGH (ref -2–2)
BASOPHILS # BLD AUTO: 0.01 K/UL — SIGNIFICANT CHANGE UP (ref 0–0.2)
BASOPHILS NFR BLD AUTO: 0.2 % — SIGNIFICANT CHANGE UP (ref 0–2)
BILIRUB SERPL-MCNC: 0.2 MG/DL — SIGNIFICANT CHANGE UP (ref 0.2–1.2)
BUN SERPL-MCNC: 16 MG/DL — SIGNIFICANT CHANGE UP (ref 7–23)
CALCIUM SERPL-MCNC: 9.9 MG/DL — SIGNIFICANT CHANGE UP (ref 8.4–10.5)
CHLORIDE SERPL-SCNC: 96 MMOL/L — SIGNIFICANT CHANGE UP (ref 96–108)
CK SERPL-CCNC: 34 U/L — SIGNIFICANT CHANGE UP (ref 25–170)
CO2 BLDA-SCNC: 50 MMOL/L — HIGH (ref 22–30)
CO2 SERPL-SCNC: 40 MMOL/L — HIGH (ref 22–31)
CREAT SERPL-MCNC: 0.35 MG/DL — LOW (ref 0.5–1.3)
CULTURE RESULTS: SIGNIFICANT CHANGE UP
CULTURE RESULTS: SIGNIFICANT CHANGE UP
EOSINOPHIL # BLD AUTO: 0.23 K/UL — SIGNIFICANT CHANGE UP (ref 0–0.5)
EOSINOPHIL NFR BLD AUTO: 3.7 % — SIGNIFICANT CHANGE UP (ref 0–6)
GAS PNL BLDA: SIGNIFICANT CHANGE UP
GLUCOSE SERPL-MCNC: 91 MG/DL — SIGNIFICANT CHANGE UP (ref 70–99)
HCO3 BLDA-SCNC: 47 MMOL/L — HIGH (ref 21–29)
HCT VFR BLD CALC: 32.3 % — LOW (ref 34.5–45)
HGB BLD-MCNC: 10.1 G/DL — LOW (ref 11.5–15.5)
HOROWITZ INDEX BLDA+IHG-RTO: 32 — SIGNIFICANT CHANGE UP
IMM GRANULOCYTES NFR BLD AUTO: 0.2 % — SIGNIFICANT CHANGE UP (ref 0–1.5)
LYMPHOCYTES # BLD AUTO: 0.92 K/UL — LOW (ref 1–3.3)
LYMPHOCYTES # BLD AUTO: 14.7 % — SIGNIFICANT CHANGE UP (ref 13–44)
MAGNESIUM SERPL-MCNC: 1.8 MG/DL — SIGNIFICANT CHANGE UP (ref 1.6–2.6)
MCHC RBC-ENTMCNC: 31.3 GM/DL — LOW (ref 32–36)
MCHC RBC-ENTMCNC: 32.8 PG — SIGNIFICANT CHANGE UP (ref 27–34)
MCV RBC AUTO: 104.9 FL — HIGH (ref 80–100)
MONOCYTES # BLD AUTO: 0.83 K/UL — SIGNIFICANT CHANGE UP (ref 0–0.9)
MONOCYTES NFR BLD AUTO: 13.2 % — SIGNIFICANT CHANGE UP (ref 2–14)
NEUTROPHILS # BLD AUTO: 4.27 K/UL — SIGNIFICANT CHANGE UP (ref 1.8–7.4)
NEUTROPHILS NFR BLD AUTO: 68 % — SIGNIFICANT CHANGE UP (ref 43–77)
PCO2 BLDA: 103 MMHG — CRITICAL HIGH (ref 32–46)
PH BLDA: 7.28 — LOW (ref 7.35–7.45)
PLATELET # BLD AUTO: 282 K/UL — SIGNIFICANT CHANGE UP (ref 150–400)
PO2 BLDA: 116 MMHG — HIGH (ref 74–108)
POTASSIUM SERPL-MCNC: 4.6 MMOL/L — SIGNIFICANT CHANGE UP (ref 3.5–5.3)
POTASSIUM SERPL-SCNC: 4.6 MMOL/L — SIGNIFICANT CHANGE UP (ref 3.5–5.3)
PROT SERPL-MCNC: 6.9 G/DL — SIGNIFICANT CHANGE UP (ref 6–8.3)
RBC # BLD: 3.08 M/UL — LOW (ref 3.8–5.2)
RBC # FLD: 12.9 % — SIGNIFICANT CHANGE UP (ref 10.3–14.5)
SAO2 % BLDA: 98 % — HIGH (ref 92–96)
SODIUM SERPL-SCNC: 144 MMOL/L — SIGNIFICANT CHANGE UP (ref 135–145)
SPECIMEN SOURCE: SIGNIFICANT CHANGE UP
SPECIMEN SOURCE: SIGNIFICANT CHANGE UP
TROPONIN T SERPL-MCNC: <0.01 NG/ML — SIGNIFICANT CHANGE UP (ref 0–0.06)
WBC # BLD: 6.27 K/UL — SIGNIFICANT CHANGE UP (ref 3.8–10.5)
WBC # FLD AUTO: 6.27 K/UL — SIGNIFICANT CHANGE UP (ref 3.8–10.5)

## 2017-01-15 PROCEDURE — 99232 SBSQ HOSP IP/OBS MODERATE 35: CPT

## 2017-01-15 RX ADMIN — Medication 1 DROP(S): at 17:15

## 2017-01-15 RX ADMIN — BRIMONIDINE TARTRATE 1 DROP(S): 2 SOLUTION/ DROPS OPHTHALMIC at 06:45

## 2017-01-15 RX ADMIN — Medication 1 DROP(S): at 00:16

## 2017-01-15 RX ADMIN — Medication 1 DROP(S): at 15:08

## 2017-01-15 RX ADMIN — LATANOPROST 1 DROP(S): 0.05 SOLUTION/ DROPS OPHTHALMIC; TOPICAL at 21:15

## 2017-01-15 RX ADMIN — Medication 1 DROP(S): at 12:05

## 2017-01-15 RX ADMIN — Medication 1 DROP(S): at 08:13

## 2017-01-15 RX ADMIN — Medication 50 MICROGRAM(S): at 06:45

## 2017-01-15 RX ADMIN — APIXABAN 5 MILLIGRAM(S): 2.5 TABLET, FILM COATED ORAL at 17:15

## 2017-01-15 RX ADMIN — Medication 1 DROP(S): at 04:10

## 2017-01-15 RX ADMIN — Medication 1 DROP(S): at 13:35

## 2017-01-15 RX ADMIN — Medication 500 MICROGRAM(S): at 06:45

## 2017-01-15 RX ADMIN — Medication 1 DROP(S): at 21:14

## 2017-01-15 RX ADMIN — Medication 0.5 MILLIGRAM(S): at 17:15

## 2017-01-15 RX ADMIN — Medication 1 DROP(S): at 06:11

## 2017-01-15 RX ADMIN — BRIMONIDINE TARTRATE 1 DROP(S): 2 SOLUTION/ DROPS OPHTHALMIC at 17:15

## 2017-01-15 RX ADMIN — Medication 500 MICROGRAM(S): at 00:17

## 2017-01-15 RX ADMIN — Medication 0.5 MILLIGRAM(S): at 06:46

## 2017-01-15 RX ADMIN — APIXABAN 5 MILLIGRAM(S): 2.5 TABLET, FILM COATED ORAL at 06:45

## 2017-01-15 RX ADMIN — Medication 120 MILLIGRAM(S): at 06:44

## 2017-01-15 RX ADMIN — Medication 1 DROP(S): at 23:21

## 2017-01-15 RX ADMIN — Medication 500 MICROGRAM(S): at 17:15

## 2017-01-15 RX ADMIN — Medication 1 DROP(S): at 10:22

## 2017-01-15 RX ADMIN — Medication 1 TABLET(S): at 12:10

## 2017-01-15 RX ADMIN — Medication 1 DROP(S): at 02:00

## 2017-01-15 RX ADMIN — Medication 500 MICROGRAM(S): at 12:05

## 2017-01-16 LAB
ANION GAP SERPL CALC-SCNC: 12 MMOL/L — SIGNIFICANT CHANGE UP (ref 5–17)
BUN SERPL-MCNC: 20 MG/DL — SIGNIFICANT CHANGE UP (ref 7–23)
CALCIUM SERPL-MCNC: 9.9 MG/DL — SIGNIFICANT CHANGE UP (ref 8.4–10.5)
CHLORIDE SERPL-SCNC: 90 MMOL/L — LOW (ref 96–108)
CO2 SERPL-SCNC: 42 MMOL/L — HIGH (ref 22–31)
CREAT SERPL-MCNC: 0.4 MG/DL — LOW (ref 0.5–1.3)
GAS PNL BLDA: SIGNIFICANT CHANGE UP
GLUCOSE SERPL-MCNC: 93 MG/DL — SIGNIFICANT CHANGE UP (ref 70–99)
HCT VFR BLD CALC: 35.9 % — SIGNIFICANT CHANGE UP (ref 34.5–45)
HGB BLD-MCNC: 10.8 G/DL — LOW (ref 11.5–15.5)
MCHC RBC-ENTMCNC: 30.1 GM/DL — LOW (ref 32–36)
MCHC RBC-ENTMCNC: 31 PG — SIGNIFICANT CHANGE UP (ref 27–34)
MCV RBC AUTO: 103.2 FL — HIGH (ref 80–100)
PLATELET # BLD AUTO: 306 K/UL — SIGNIFICANT CHANGE UP (ref 150–400)
POTASSIUM SERPL-MCNC: 4.9 MMOL/L — SIGNIFICANT CHANGE UP (ref 3.5–5.3)
POTASSIUM SERPL-SCNC: 4.9 MMOL/L — SIGNIFICANT CHANGE UP (ref 3.5–5.3)
RBC # BLD: 3.48 M/UL — LOW (ref 3.8–5.2)
RBC # FLD: 12.9 % — SIGNIFICANT CHANGE UP (ref 10.3–14.5)
SODIUM SERPL-SCNC: 144 MMOL/L — SIGNIFICANT CHANGE UP (ref 135–145)
WBC # BLD: 6.94 K/UL — SIGNIFICANT CHANGE UP (ref 3.8–10.5)
WBC # FLD AUTO: 6.94 K/UL — SIGNIFICANT CHANGE UP (ref 3.8–10.5)

## 2017-01-16 PROCEDURE — 99233 SBSQ HOSP IP/OBS HIGH 50: CPT

## 2017-01-16 RX ADMIN — Medication 1 DROP(S): at 16:00

## 2017-01-16 RX ADMIN — Medication 1 DROP(S): at 02:02

## 2017-01-16 RX ADMIN — Medication 500 MICROGRAM(S): at 00:00

## 2017-01-16 RX ADMIN — Medication 0.5 MILLIGRAM(S): at 20:12

## 2017-01-16 RX ADMIN — Medication 0.5 MILLIGRAM(S): at 05:36

## 2017-01-16 RX ADMIN — Medication 500 MICROGRAM(S): at 05:36

## 2017-01-16 RX ADMIN — Medication 1 DROP(S): at 18:11

## 2017-01-16 RX ADMIN — Medication 1 DROP(S): at 20:13

## 2017-01-16 RX ADMIN — Medication 50 MICROGRAM(S): at 05:36

## 2017-01-16 RX ADMIN — APIXABAN 5 MILLIGRAM(S): 2.5 TABLET, FILM COATED ORAL at 05:36

## 2017-01-16 RX ADMIN — Medication 500 MICROGRAM(S): at 18:10

## 2017-01-16 RX ADMIN — Medication 1 TABLET(S): at 12:43

## 2017-01-16 RX ADMIN — Medication 1 DROP(S): at 00:01

## 2017-01-16 RX ADMIN — Medication 1 DROP(S): at 08:30

## 2017-01-16 RX ADMIN — Medication 500 MICROGRAM(S): at 23:13

## 2017-01-16 RX ADMIN — Medication 1 DROP(S): at 05:35

## 2017-01-16 RX ADMIN — Medication 1 DROP(S): at 14:45

## 2017-01-16 RX ADMIN — BRIMONIDINE TARTRATE 1 DROP(S): 2 SOLUTION/ DROPS OPHTHALMIC at 05:36

## 2017-01-16 RX ADMIN — Medication 1 DROP(S): at 10:30

## 2017-01-16 RX ADMIN — Medication 1 DROP(S): at 22:01

## 2017-01-16 RX ADMIN — Medication 1 DROP(S): at 12:43

## 2017-01-16 RX ADMIN — BRIMONIDINE TARTRATE 1 DROP(S): 2 SOLUTION/ DROPS OPHTHALMIC at 18:10

## 2017-01-16 RX ADMIN — Medication 1 DROP(S): at 04:36

## 2017-01-16 RX ADMIN — APIXABAN 5 MILLIGRAM(S): 2.5 TABLET, FILM COATED ORAL at 18:10

## 2017-01-16 RX ADMIN — LATANOPROST 1 DROP(S): 0.05 SOLUTION/ DROPS OPHTHALMIC; TOPICAL at 22:01

## 2017-01-16 RX ADMIN — Medication 120 MILLIGRAM(S): at 05:36

## 2017-01-16 RX ADMIN — Medication 1 DROP(S): at 23:14

## 2017-01-16 RX ADMIN — Medication 500 MICROGRAM(S): at 12:43

## 2017-01-16 NOTE — PROVIDER CONTACT NOTE (CRITICAL VALUE NOTIFICATION) - ACTION/TREATMENT ORDERED:
put pt back on bipap 1 -2 hr post dinner
Pt. educated by dr. Dr. Moreland and  agrees to wear Bipap intermittently.

## 2017-01-16 NOTE — PROVIDER CONTACT NOTE (CRITICAL VALUE NOTIFICATION) - RECOMMENDATIONS
put pt back on bipap 1 -2 hr post dinner
Encourage patient to use BIPAP intermittently in order to decrease CO2 level

## 2017-01-16 NOTE — PROVIDER CONTACT NOTE (CRITICAL VALUE NOTIFICATION) - BACKGROUND
Admitting Dx: acute on chronic respiratory failure (sec to restrictive lung diseae) 2/2 PNA
Pt. admitted with Pneumonia, and  Chronic respiratory failure

## 2017-01-17 ENCOUNTER — TRANSCRIPTION ENCOUNTER (OUTPATIENT)
Age: 67
End: 2017-01-17

## 2017-01-17 VITALS
OXYGEN SATURATION: 99 % | SYSTOLIC BLOOD PRESSURE: 110 MMHG | HEART RATE: 70 BPM | TEMPERATURE: 98 F | RESPIRATION RATE: 17 BRPM | DIASTOLIC BLOOD PRESSURE: 52 MMHG

## 2017-01-17 LAB
ANION GAP SERPL CALC-SCNC: 15 MMOL/L — SIGNIFICANT CHANGE UP (ref 5–17)
BUN SERPL-MCNC: 21 MG/DL — SIGNIFICANT CHANGE UP (ref 7–23)
CALCIUM SERPL-MCNC: 9.9 MG/DL — SIGNIFICANT CHANGE UP (ref 8.4–10.5)
CHLORIDE SERPL-SCNC: 88 MMOL/L — LOW (ref 96–108)
CO2 SERPL-SCNC: 40 MMOL/L — HIGH (ref 22–31)
CREAT SERPL-MCNC: 0.41 MG/DL — LOW (ref 0.5–1.3)
GLUCOSE SERPL-MCNC: 91 MG/DL — SIGNIFICANT CHANGE UP (ref 70–99)
HCT VFR BLD CALC: 33.1 % — LOW (ref 34.5–45)
HGB BLD-MCNC: 9.7 G/DL — LOW (ref 11.5–15.5)
MCHC RBC-ENTMCNC: 29.3 GM/DL — LOW (ref 32–36)
MCHC RBC-ENTMCNC: 30.7 PG — SIGNIFICANT CHANGE UP (ref 27–34)
MCV RBC AUTO: 104.7 FL — HIGH (ref 80–100)
PLATELET # BLD AUTO: 314 K/UL — SIGNIFICANT CHANGE UP (ref 150–400)
POTASSIUM SERPL-MCNC: 4.6 MMOL/L — SIGNIFICANT CHANGE UP (ref 3.5–5.3)
POTASSIUM SERPL-SCNC: 4.6 MMOL/L — SIGNIFICANT CHANGE UP (ref 3.5–5.3)
RBC # BLD: 3.16 M/UL — LOW (ref 3.8–5.2)
RBC # FLD: 12.7 % — SIGNIFICANT CHANGE UP (ref 10.3–14.5)
SODIUM SERPL-SCNC: 143 MMOL/L — SIGNIFICANT CHANGE UP (ref 135–145)
WBC # BLD: 6.75 K/UL — SIGNIFICANT CHANGE UP (ref 3.8–10.5)
WBC # FLD AUTO: 6.75 K/UL — SIGNIFICANT CHANGE UP (ref 3.8–10.5)

## 2017-01-17 PROCEDURE — 82803 BLOOD GASES ANY COMBINATION: CPT

## 2017-01-17 PROCEDURE — 36600 WITHDRAWAL OF ARTERIAL BLOOD: CPT

## 2017-01-17 PROCEDURE — 85027 COMPLETE CBC AUTOMATED: CPT

## 2017-01-17 PROCEDURE — 83605 ASSAY OF LACTIC ACID: CPT

## 2017-01-17 PROCEDURE — 93970 EXTREMITY STUDY: CPT

## 2017-01-17 PROCEDURE — 82728 ASSAY OF FERRITIN: CPT

## 2017-01-17 PROCEDURE — 82435 ASSAY OF BLOOD CHLORIDE: CPT

## 2017-01-17 PROCEDURE — 83550 IRON BINDING TEST: CPT

## 2017-01-17 PROCEDURE — 84132 ASSAY OF SERUM POTASSIUM: CPT

## 2017-01-17 PROCEDURE — 87798 DETECT AGENT NOS DNA AMP: CPT

## 2017-01-17 PROCEDURE — 93005 ELECTROCARDIOGRAM TRACING: CPT

## 2017-01-17 PROCEDURE — 87581 M.PNEUMON DNA AMP PROBE: CPT

## 2017-01-17 PROCEDURE — 82330 ASSAY OF CALCIUM: CPT

## 2017-01-17 PROCEDURE — 82607 VITAMIN B-12: CPT

## 2017-01-17 PROCEDURE — 87449 NOS EACH ORGANISM AG IA: CPT

## 2017-01-17 PROCEDURE — 84295 ASSAY OF SERUM SODIUM: CPT

## 2017-01-17 PROCEDURE — 81001 URINALYSIS AUTO W/SCOPE: CPT

## 2017-01-17 PROCEDURE — 80048 BASIC METABOLIC PNL TOTAL CA: CPT

## 2017-01-17 PROCEDURE — 87040 BLOOD CULTURE FOR BACTERIA: CPT

## 2017-01-17 PROCEDURE — 93306 TTE W/DOPPLER COMPLETE: CPT

## 2017-01-17 PROCEDURE — 82553 CREATINE MB FRACTION: CPT

## 2017-01-17 PROCEDURE — 82550 ASSAY OF CK (CPK): CPT

## 2017-01-17 PROCEDURE — 99285 EMERGENCY DEPT VISIT HI MDM: CPT | Mod: 25

## 2017-01-17 PROCEDURE — 97162 PT EVAL MOD COMPLEX 30 MIN: CPT

## 2017-01-17 PROCEDURE — 87486 CHLMYD PNEUM DNA AMP PROBE: CPT

## 2017-01-17 PROCEDURE — 94640 AIRWAY INHALATION TREATMENT: CPT

## 2017-01-17 PROCEDURE — 83735 ASSAY OF MAGNESIUM: CPT

## 2017-01-17 PROCEDURE — 84484 ASSAY OF TROPONIN QUANT: CPT

## 2017-01-17 PROCEDURE — 84100 ASSAY OF PHOSPHORUS: CPT

## 2017-01-17 PROCEDURE — 71250 CT THORAX DX C-: CPT

## 2017-01-17 PROCEDURE — 71045 X-RAY EXAM CHEST 1 VIEW: CPT

## 2017-01-17 PROCEDURE — 84439 ASSAY OF FREE THYROXINE: CPT

## 2017-01-17 PROCEDURE — 80053 COMPREHEN METABOLIC PANEL: CPT

## 2017-01-17 PROCEDURE — 82746 ASSAY OF FOLIC ACID SERUM: CPT

## 2017-01-17 PROCEDURE — 99233 SBSQ HOSP IP/OBS HIGH 50: CPT

## 2017-01-17 PROCEDURE — 85014 HEMATOCRIT: CPT

## 2017-01-17 PROCEDURE — 82947 ASSAY GLUCOSE BLOOD QUANT: CPT

## 2017-01-17 PROCEDURE — 87633 RESP VIRUS 12-25 TARGETS: CPT

## 2017-01-17 PROCEDURE — 94660 CPAP INITIATION&MGMT: CPT

## 2017-01-17 PROCEDURE — 84443 ASSAY THYROID STIM HORMONE: CPT

## 2017-01-17 RX ORDER — APIXABAN 2.5 MG/1
1 TABLET, FILM COATED ORAL
Qty: 60 | Refills: 0 | OUTPATIENT
Start: 2017-01-17 | End: 2017-02-16

## 2017-01-17 RX ORDER — BUDESONIDE, MICRONIZED 100 %
0.5 POWDER (GRAM) MISCELLANEOUS
Qty: 30 | Refills: 0 | OUTPATIENT
Start: 2017-01-17 | End: 2017-02-16

## 2017-01-17 RX ORDER — IPRATROPIUM BROMIDE 0.2 MG/ML
2.5 SOLUTION, NON-ORAL INHALATION
Qty: 30 | Refills: 0 | OUTPATIENT
Start: 2017-01-17 | End: 2017-02-16

## 2017-01-17 RX ORDER — ALBUTEROL 90 UG/1
1 AEROSOL, METERED ORAL
Qty: 0 | Refills: 0 | COMMUNITY

## 2017-01-17 RX ORDER — ALBUTEROL 90 UG/1
3 AEROSOL, METERED ORAL
Qty: 0 | Refills: 0 | COMMUNITY

## 2017-01-17 RX ORDER — MOMETASONE FUROATE 220 UG/1
1 INHALANT RESPIRATORY (INHALATION)
Qty: 0 | Refills: 0 | COMMUNITY

## 2017-01-17 RX ADMIN — Medication 1 DROP(S): at 11:33

## 2017-01-17 RX ADMIN — Medication 1 DROP(S): at 13:05

## 2017-01-17 RX ADMIN — Medication 500 MICROGRAM(S): at 11:33

## 2017-01-17 RX ADMIN — Medication 1 DROP(S): at 01:54

## 2017-01-17 RX ADMIN — Medication 1 DROP(S): at 09:39

## 2017-01-17 RX ADMIN — Medication 50 MICROGRAM(S): at 06:05

## 2017-01-17 RX ADMIN — APIXABAN 5 MILLIGRAM(S): 2.5 TABLET, FILM COATED ORAL at 06:06

## 2017-01-17 RX ADMIN — Medication 1 DROP(S): at 10:22

## 2017-01-17 RX ADMIN — Medication 500 MICROGRAM(S): at 06:06

## 2017-01-17 RX ADMIN — Medication 1 DROP(S): at 03:52

## 2017-01-17 RX ADMIN — BRIMONIDINE TARTRATE 1 DROP(S): 2 SOLUTION/ DROPS OPHTHALMIC at 06:05

## 2017-01-17 RX ADMIN — Medication 0.5 MILLIGRAM(S): at 06:27

## 2017-01-17 RX ADMIN — Medication 120 MILLIGRAM(S): at 06:05

## 2017-01-17 RX ADMIN — Medication 1 DROP(S): at 06:05

## 2017-01-17 RX ADMIN — Medication 1 TABLET(S): at 11:33

## 2017-01-17 NOTE — DISCHARGE NOTE ADULT - PLAN OF CARE
Pneumonia is a lung infection that can cause a fever, cough, and trouble breathing.  Continue all antibiotics as ordered until complete.  Nutrition is important, eat small frequent meals.  Get lots of rest and drink fluids.  Call your health care provider upon arrival home from hospital and make a follow up appointment for one week.  If your cough worsens, you develop fever greater than 101', you have shaking chills, a fast heartbeat, trouble breathing and/or feel your are breathing much faster than usual, call your healthcare provider.  Make sure you wash your hands frequently. resolved Call your Health Care provider upon arrival home to make a follow up appointment within one week.  Take all inhalers as prescribed by your Health Care Provider.  Take steroids as prescribed by your Health Care Provider.  If your cough increases infrequency and severity and/or you have shortness of breath or increased shortness of breath call your Health Care Provider.  If you develop fever, chills, night sweats, malaise, and/or change in mental status call your Health care Provider.  Nutrition is very important.  Eat small frequent meals.  Increase your activity as tolerated.  Do not stay in bed all day symptom management disease management Continue your home oxygen  A "Trilogy AVAFS" - breathing assist device when you are sleeping has been arranged for you at Dr Hu's request - make sure you make an appointment to follow up with him - call his office when you get home to make an appointment rate and rhythm control You are being discharged on a heart monitor - make an appointment to see Dr Mccarthy (cardiologist) to follow up

## 2017-01-17 NOTE — DISCHARGE NOTE ADULT - CARE PLAN
Principal Discharge DX:	Community acquired pneumonia  Goal:	resolved  Instructions for follow-up, activity and diet:	Pneumonia is a lung infection that can cause a fever, cough, and trouble breathing.  Continue all antibiotics as ordered until complete.  Nutrition is important, eat small frequent meals.  Get lots of rest and drink fluids.  Call your health care provider upon arrival home from hospital and make a follow up appointment for one week.  If your cough worsens, you develop fever greater than 101', you have shaking chills, a fast heartbeat, trouble breathing and/or feel your are breathing much faster than usual, call your healthcare provider.  Make sure you wash your hands frequently.  Secondary Diagnosis:	COPD (chronic obstructive pulmonary disease)  Goal:	symptom management  Instructions for follow-up, activity and diet:	Call your Health Care provider upon arrival home to make a follow up appointment within one week.  Take all inhalers as prescribed by your Health Care Provider.  Take steroids as prescribed by your Health Care Provider.  If your cough increases infrequency and severity and/or you have shortness of breath or increased shortness of breath call your Health Care Provider.  If you develop fever, chills, night sweats, malaise, and/or change in mental status call your Health care Provider.  Nutrition is very important.  Eat small frequent meals.  Increase your activity as tolerated.  Do not stay in bed all day  Secondary Diagnosis:	Hypercapnia  Goal:	disease management  Instructions for follow-up, activity and diet:	Continue your home oxygen  A "Trilogy AVAFS" - breathing assist device when you are sleeping has been arranged for you at Dr Hu's request - make sure you make an appointment to follow up with him - call his office when you get home to make an appointment  Secondary Diagnosis:	PAF (paroxysmal atrial fibrillation)  Goal:	rate and rhythm control  Instructions for follow-up, activity and diet:	You are being discharged on a heart monitor - make an appointment to see Dr Mccarthy (cardiologist) to follow up

## 2017-01-17 NOTE — DISCHARGE NOTE ADULT - FINDINGS/TREATMENT
1/9/17: CT Scan - Chest: CHEST:     LUNGS AND LARGE AIRWAYS: Patent central airways. Dense consolidation of   the lingula. Atelectatic changes at the bilateral lung bases.  PLEURA: No pleural effusion.  VESSELS: Atherosclerotic changes of the thoracic aorta.  HEART: Heart size is normal.No pericardial effusion.  MEDIASTINUM AND YUE: Small volume mediastinal nodes.  CHEST WALL AND LOWER NECK: Within normal limits.  VISUALIZED UPPER ABDOMEN: Tiny calcified granuloma of the spleen.  BONES: Severe dextroscoliotic curvature of the thoracic spine with   resultant volume loss of the right hemithorax. Diffuse osteopenia. 1/11/17: Transthoracic Echocardiogram: Conclusions:  1. Increased relative wall thickness with normal left  ventricular mass index, consistent with concentric left  ventricular remodeling.  2. Hyperdynamic left ventricle.  3. Estimated pulmonary artery systolic pressure equals 37  mm Hg, assuming right atrial pressure equals 8 mm Hg,  consistent with borderline pulmonary pressures 1/13/17: Bilat Lower Extremity Duplex:   1.  No evidence of bilateral lower extremity deep venous thrombosis.

## 2017-01-17 NOTE — DISCHARGE NOTE ADULT - CARE PROVIDER_API CALL
Lincoln Hu), Critical Care Medicine; Internal Medicine; Pulmonary Disease  [address]  [place] [address]  Phone: [phone]  Fax: [phone]    Zachary Mccarthy), Internal Medicine  [address] [place] [address]  Phone: [phone]  Fax: [phone]

## 2017-01-17 NOTE — DISCHARGE NOTE ADULT - MEDICATION SUMMARY - MEDICATIONS TO TAKE
I will START or STAY ON the medications listed below when I get home from the hospital:    budesonide 0.5 mg/2 mL inhalation suspension  -- 0.5 milligram(s) inhaled every 12 hours  -- Indication: For Respiratory disease    diltiaZEM 120 mg/24 hours oral capsule, extended release  -- 1 cap(s) by mouth once a day  -- Indication: For Atrial fibrillation    apixaban 5 mg oral tablet  -- 1 tab(s) by mouth 2 times a day  -- Indication: For Atrial fibrillation    ipratropium 500 mcg/2.5 mL inhalation solution  -- 2.5 milliliter(s) inhaled every 6 hours  -- Indication: For Respiratory disease    Xalatan 0.005% ophthalmic solution  -- 1 drop(s) to each affected eye once a day (at bedtime)  -- each eye  -- Indication: For eye drops    Alphagan P 0.1% ophthalmic solution  -- 1 drop(s) to each affected eye 2 times a day  -- Left Eye  -- Indication: For eye drops    Restasis 0.05% ophthalmic emulsion  -- 1 drop(s) to each affected eye 2 times a day  -- Indication: For eye drops    levothyroxine 50 mcg (0.05 mg) oral tablet  -- 1 tab(s) by mouth once a day  -- Indication: For Hypothyroid    multivitamin  -- 1 tab(s) by mouth once a day  -- Indication: For Suppliment    Vitamin D3 1000 intl units oral tablet  -- 1 tab(s) by mouth once a day  -- Indication: For Suppliment

## 2017-01-17 NOTE — DISCHARGE NOTE ADULT - ADDITIONAL INSTRUCTIONS
Make appointments to follow up with your physicians  Bring all discharge paperwork including discharge medication list to follow up appointments

## 2017-01-17 NOTE — DISCHARGE NOTE ADULT - MEDICATION SUMMARY - MEDICATIONS TO CHANGE
I will SWITCH the dose or number of times a day I take the medications listed below when I get home from the hospital:    Asmanex Twisthaler 120 Dose 220 mcg/inh inhalation aerosol powder  -- 1 puff(s) inhaled once a day    ProAir HFA 90 mcg/inh inhalation aerosol  -- 1 puff(s) inhaled , As Needed (a minute spread apart)    Asmanex Twisthaler 30 Dose 220 mcg/inh inhalation aerosol powder  -- 1 puff(s) inhaled once a day (in the morning)

## 2017-01-17 NOTE — DISCHARGE NOTE ADULT - HOME CARE AGENCY
[facility] homecare  agency for teaching. assessment and evaluation for Home aide and home physical thertapy  [phone] [facility] homecare  agency for teaching. assessment and evaluation for Home aide and home physical thertapy  .

## 2017-01-17 NOTE — DISCHARGE NOTE ADULT - PATIENT PORTAL LINK FT
“You can access the FollowHealth Patient Portal, offered by [facility], by registering with the following website: [email or web address]”

## 2017-01-25 ENCOUNTER — APPOINTMENT (OUTPATIENT)
Age: 67
End: 2017-01-25

## 2017-01-27 ENCOUNTER — APPOINTMENT (OUTPATIENT)
Dept: CARDIOLOGY | Facility: CLINIC | Age: 67
End: 2017-01-27

## 2017-01-27 ENCOUNTER — NON-APPOINTMENT (OUTPATIENT)
Age: 67
End: 2017-01-27

## 2017-01-27 VITALS
DIASTOLIC BLOOD PRESSURE: 70 MMHG | OXYGEN SATURATION: 94 % | HEIGHT: 58 IN | WEIGHT: 83 LBS | SYSTOLIC BLOOD PRESSURE: 160 MMHG | BODY MASS INDEX: 17.42 KG/M2 | HEART RATE: 71 BPM | TEMPERATURE: 98 F

## 2017-01-27 DIAGNOSIS — R06.89 OTHER ABNORMALITIES OF BREATHING: ICD-10-CM

## 2017-01-27 RX ORDER — ALBUTEROL SULFATE 90 UG/1
108 (90 BASE) AEROSOL, METERED RESPIRATORY (INHALATION)
Qty: 1 | Refills: 3 | Status: ACTIVE | COMMUNITY
Start: 2017-01-27

## 2017-02-27 ENCOUNTER — NON-APPOINTMENT (OUTPATIENT)
Age: 67
End: 2017-02-27

## 2017-02-27 ENCOUNTER — APPOINTMENT (OUTPATIENT)
Dept: CARDIOLOGY | Facility: CLINIC | Age: 67
End: 2017-02-27

## 2017-02-27 VITALS
SYSTOLIC BLOOD PRESSURE: 168 MMHG | OXYGEN SATURATION: 95 % | HEART RATE: 81 BPM | DIASTOLIC BLOOD PRESSURE: 64 MMHG | BODY MASS INDEX: 16.37 KG/M2 | HEIGHT: 58 IN | WEIGHT: 78 LBS | TEMPERATURE: 98 F

## 2017-02-27 VITALS — SYSTOLIC BLOOD PRESSURE: 139 MMHG | DIASTOLIC BLOOD PRESSURE: 78 MMHG

## 2017-04-24 ENCOUNTER — APPOINTMENT (OUTPATIENT)
Dept: CARDIOLOGY | Facility: CLINIC | Age: 67
End: 2017-04-24

## 2017-04-24 ENCOUNTER — NON-APPOINTMENT (OUTPATIENT)
Age: 67
End: 2017-04-24

## 2017-04-24 VITALS
SYSTOLIC BLOOD PRESSURE: 172 MMHG | BODY MASS INDEX: 16.37 KG/M2 | DIASTOLIC BLOOD PRESSURE: 74 MMHG | HEIGHT: 58 IN | HEART RATE: 84 BPM | WEIGHT: 78 LBS

## 2017-04-24 VITALS — DIASTOLIC BLOOD PRESSURE: 68 MMHG | SYSTOLIC BLOOD PRESSURE: 138 MMHG

## 2017-04-24 RX ORDER — APIXABAN 5 MG/1
5 TABLET, FILM COATED ORAL
Qty: 60 | Refills: 5 | Status: DISCONTINUED | COMMUNITY
Start: 2017-01-27 | End: 2017-04-24

## 2017-07-24 ENCOUNTER — NON-APPOINTMENT (OUTPATIENT)
Age: 67
End: 2017-07-24

## 2017-07-24 ENCOUNTER — APPOINTMENT (OUTPATIENT)
Dept: CARDIOLOGY | Facility: CLINIC | Age: 67
End: 2017-07-24

## 2017-07-24 VITALS
DIASTOLIC BLOOD PRESSURE: 58 MMHG | OXYGEN SATURATION: 92 % | BODY MASS INDEX: 16.16 KG/M2 | SYSTOLIC BLOOD PRESSURE: 175 MMHG | TEMPERATURE: 97.7 F | WEIGHT: 77 LBS | HEART RATE: 70 BPM | HEIGHT: 58 IN

## 2017-09-21 ENCOUNTER — APPOINTMENT (OUTPATIENT)
Dept: GASTROENTEROLOGY | Facility: CLINIC | Age: 67
End: 2017-09-21
Payer: MEDICARE

## 2017-09-21 VITALS
DIASTOLIC BLOOD PRESSURE: 72 MMHG | HEIGHT: 58 IN | BODY MASS INDEX: 15.74 KG/M2 | HEART RATE: 68 BPM | SYSTOLIC BLOOD PRESSURE: 165 MMHG | TEMPERATURE: 97.6 F | RESPIRATION RATE: 14 BRPM | OXYGEN SATURATION: 97 % | WEIGHT: 75 LBS

## 2017-09-21 DIAGNOSIS — Z83.71 FAMILY HISTORY OF COLONIC POLYPS: ICD-10-CM

## 2017-09-21 DIAGNOSIS — Z85.048 PERSONAL HISTORY OF OTHER MALIGNANT NEOPLASM OF RECTUM, RECTOSIGMOID JUNCTION, AND ANUS: ICD-10-CM

## 2017-09-21 DIAGNOSIS — Z80.3 FAMILY HISTORY OF MALIGNANT NEOPLASM OF BREAST: ICD-10-CM

## 2017-09-21 DIAGNOSIS — B18.2 CHRONIC VIRAL HEPATITIS C: ICD-10-CM

## 2017-09-21 DIAGNOSIS — K21.9 GASTRO-ESOPHAGEAL REFLUX DISEASE W/OUT ESOPHAGITIS: ICD-10-CM

## 2017-09-21 DIAGNOSIS — M81.0 AGE-RELATED OSTEOPOROSIS W/OUT CURRENT PATHOLOGICAL FRACTURE: ICD-10-CM

## 2017-09-21 DIAGNOSIS — Z78.9 OTHER SPECIFIED HEALTH STATUS: ICD-10-CM

## 2017-09-21 PROCEDURE — 99204 OFFICE O/P NEW MOD 45 MIN: CPT

## 2017-10-11 ENCOUNTER — APPOINTMENT (OUTPATIENT)
Dept: SURGERY | Facility: CLINIC | Age: 67
End: 2017-10-11
Payer: MEDICARE

## 2017-10-11 VITALS
RESPIRATION RATE: 16 BRPM | TEMPERATURE: 97.9 F | SYSTOLIC BLOOD PRESSURE: 158 MMHG | BODY MASS INDEX: 15.95 KG/M2 | HEART RATE: 71 BPM | OXYGEN SATURATION: 98 % | DIASTOLIC BLOOD PRESSURE: 63 MMHG | HEIGHT: 58 IN | WEIGHT: 76 LBS

## 2017-10-11 DIAGNOSIS — Z12.11 ENCOUNTER FOR SCREENING FOR MALIGNANT NEOPLASM OF COLON: ICD-10-CM

## 2017-10-11 DIAGNOSIS — Z78.9 OTHER SPECIFIED HEALTH STATUS: ICD-10-CM

## 2017-10-11 PROCEDURE — 99214 OFFICE O/P EST MOD 30 MIN: CPT

## 2017-10-11 RX ORDER — BUDESONIDE 0.5 MG/2ML
0.5 INHALANT ORAL DAILY
Qty: 1 | Refills: 0 | Status: DISCONTINUED | COMMUNITY
Start: 2017-01-27 | End: 2017-10-11

## 2017-10-11 RX ORDER — IPRATROPIUM BROMIDE AND ALBUTEROL SULFATE 2.5; .5 MG/3ML; MG/3ML
0.5-2.5 (3) SOLUTION RESPIRATORY (INHALATION)
Qty: 2 | Refills: 2 | Status: DISCONTINUED | COMMUNITY
Start: 2017-01-27 | End: 2017-10-11

## 2017-10-11 RX ORDER — ALBUTEROL SULFATE 0.63 MG/3ML
0.63 SOLUTION RESPIRATORY (INHALATION)
Refills: 0 | Status: ACTIVE | COMMUNITY

## 2017-10-30 ENCOUNTER — APPOINTMENT (OUTPATIENT)
Dept: CARDIOLOGY | Facility: CLINIC | Age: 67
End: 2017-10-30

## 2017-11-03 ENCOUNTER — OUTPATIENT (OUTPATIENT)
Dept: OUTPATIENT SERVICES | Facility: HOSPITAL | Age: 67
LOS: 1 days | End: 2017-11-03
Payer: MEDICARE

## 2017-11-03 VITALS
OXYGEN SATURATION: 95 % | TEMPERATURE: 98 F | DIASTOLIC BLOOD PRESSURE: 70 MMHG | WEIGHT: 76.28 LBS | SYSTOLIC BLOOD PRESSURE: 160 MMHG | HEART RATE: 71 BPM | HEIGHT: 55 IN | RESPIRATION RATE: 16 BRPM

## 2017-11-03 DIAGNOSIS — Z90.49 ACQUIRED ABSENCE OF OTHER SPECIFIED PARTS OF DIGESTIVE TRACT: Chronic | ICD-10-CM

## 2017-11-03 DIAGNOSIS — Z93.0 TRACHEOSTOMY STATUS: Chronic | ICD-10-CM

## 2017-11-03 DIAGNOSIS — J98.4 OTHER DISORDERS OF LUNG: ICD-10-CM

## 2017-11-03 DIAGNOSIS — Z98.89 OTHER SPECIFIED POSTPROCEDURAL STATES: Chronic | ICD-10-CM

## 2017-11-03 DIAGNOSIS — D12.8 BENIGN NEOPLASM OF RECTUM: ICD-10-CM

## 2017-11-03 DIAGNOSIS — Z98.41 CATARACT EXTRACTION STATUS, RIGHT EYE: Chronic | ICD-10-CM

## 2017-11-03 DIAGNOSIS — Z41.9 ENCOUNTER FOR PROCEDURE FOR PURPOSES OTHER THAN REMEDYING HEALTH STATE, UNSPECIFIED: Chronic | ICD-10-CM

## 2017-11-03 DIAGNOSIS — Z98.1 ARTHRODESIS STATUS: Chronic | ICD-10-CM

## 2017-11-03 DIAGNOSIS — C20 MALIGNANT NEOPLASM OF RECTUM: ICD-10-CM

## 2017-11-03 LAB
ANION GAP SERPL CALC-SCNC: 13 MMOL/L — SIGNIFICANT CHANGE UP (ref 5–17)
BUN SERPL-MCNC: 19 MG/DL — SIGNIFICANT CHANGE UP (ref 7–23)
CALCIUM SERPL-MCNC: 10.5 MG/DL — SIGNIFICANT CHANGE UP (ref 8.4–10.5)
CHLORIDE SERPL-SCNC: 99 MMOL/L — SIGNIFICANT CHANGE UP (ref 96–108)
CO2 SERPL-SCNC: 31 MMOL/L — SIGNIFICANT CHANGE UP (ref 22–31)
CREAT SERPL-MCNC: 0.55 MG/DL — SIGNIFICANT CHANGE UP (ref 0.5–1.3)
GLUCOSE SERPL-MCNC: 85 MG/DL — SIGNIFICANT CHANGE UP (ref 70–99)
HCT VFR BLD CALC: 37.1 % — SIGNIFICANT CHANGE UP (ref 34.5–45)
HGB BLD-MCNC: 11.8 G/DL — SIGNIFICANT CHANGE UP (ref 11.5–15.5)
MCHC RBC-ENTMCNC: 31.1 PG — SIGNIFICANT CHANGE UP (ref 27–34)
MCHC RBC-ENTMCNC: 31.8 GM/DL — LOW (ref 32–36)
MCV RBC AUTO: 97.9 FL — SIGNIFICANT CHANGE UP (ref 80–100)
PLATELET # BLD AUTO: 285 K/UL — SIGNIFICANT CHANGE UP (ref 150–400)
POTASSIUM SERPL-MCNC: 4.4 MMOL/L — SIGNIFICANT CHANGE UP (ref 3.5–5.3)
POTASSIUM SERPL-SCNC: 4.4 MMOL/L — SIGNIFICANT CHANGE UP (ref 3.5–5.3)
RBC # BLD: 3.79 M/UL — LOW (ref 3.8–5.2)
RBC # FLD: 13.2 % — SIGNIFICANT CHANGE UP (ref 10.3–14.5)
SODIUM SERPL-SCNC: 143 MMOL/L — SIGNIFICANT CHANGE UP (ref 135–145)
WBC # BLD: 7.38 K/UL — SIGNIFICANT CHANGE UP (ref 3.8–10.5)
WBC # FLD AUTO: 7.38 K/UL — SIGNIFICANT CHANGE UP (ref 3.8–10.5)

## 2017-11-03 PROCEDURE — 85027 COMPLETE CBC AUTOMATED: CPT

## 2017-11-03 PROCEDURE — 80048 BASIC METABOLIC PNL TOTAL CA: CPT

## 2017-11-03 PROCEDURE — G0463: CPT

## 2017-11-03 RX ORDER — CHOLECALCIFEROL (VITAMIN D3) 125 MCG
1 CAPSULE ORAL
Qty: 0 | Refills: 0 | COMMUNITY

## 2017-11-03 RX ORDER — CYCLOSPORINE 0.5 MG/ML
1 EMULSION OPHTHALMIC
Qty: 0 | Refills: 0 | COMMUNITY

## 2017-11-03 RX ORDER — ALBUTEROL 90 UG/1
0 AEROSOL, METERED ORAL
Qty: 0 | Refills: 0 | COMMUNITY

## 2017-11-03 NOTE — H&P PST ADULT - PSH
H/O eye surgery  multiple for cataract and vitrectomy  H/O tracheostomy  in for about 1 month s/p respiratory failure, 2009, 2015  History of colon resection  - low anterior with loop ileostomy, 2008, reversal in 2009  History of spinal fusion  for scoliosis, no rods, fused with autologous bone, 1959  Surgery, other elective  multiple surgeries orthopedic as a child, due to scoliosis and club feet, and tendon surgeries all as a child

## 2017-11-03 NOTE — H&P PST ADULT - NSANTHOSAYNRD_GEN_A_CORE
No. JOSH screening performed.  STOP BANG Legend: 0-2 = LOW Risk; 3-4 = INTERMEDIATE Risk; 5-8 = HIGH Risk

## 2017-11-03 NOTE — H&P PST ADULT - HISTORY OF PRESENT ILLNESS
Patient is a 67 y.o. female with h/o restrictive lung disease due to severe kyphoscoliosis (on 2L O2 with V-PAP at night), rectal Ca s/p colon resection (2009) s/p hospitalization with SBO on 08/28/2017 while in [place]. Patient was treated conservatively with NGT and decompression. All symptoms resolved. She is now scheduled for a follow-up Colonoscopy. Patient is a 67 y.o. female with h/o restrictive lung disease (on 2L O2 with V-PAP at night) due to arthrogryposis resulting in severe kyphoscoliosis, rectal Ca s/p colon resection (2009) s/p hospitalization with SBO on 08/28/2017 while in [place]. Patient was treated conservatively with NGT and decompression. All symptoms resolved. She is now scheduled for a follow-up Colonoscopy.

## 2017-11-03 NOTE — H&P PST ADULT - VENOUS THROMBOEMBOLISM HISTORY
prior major surgery/serious lung disease including pneumonia (less than 1 mo ago)/previous malignancy

## 2017-11-03 NOTE — H&P PST ADULT - PMH
Adult Hypothyroidism    Arthrogryposis multiplex congenita    Cataract    Congenital Scoliosis    COPD (Chronic Obstructive Pulmonary Disease)  restrictive secondary to scoliosis/arthrogryposis  Hepatitis C  treated  Osteoporosis    Pneumonia  multiple, last time 01/2017, complicated by A-fib  Rectal cancer  2008, s/p chemo and colon resection  Respiratory failure  was on a Vent for 1 month in 2009  Small bowel obstruction due to adhesions  08/28/17, treated conservatively Adult Hypothyroidism    Arthrogryposis multiplex congenita    Cataract    Congenital Scoliosis    Hepatitis C  treated with Harvoni, 2015  Hypercarbia    Osteoporosis    Paroxysmal a-fib  01/2017, in the setting of severe pneumonia, resolved  Pneumonia  multiple, last time 01/2017, complicated by A-fib  Rectal cancer  2008, s/p chemo and colon resection  Respiratory failure  was on a Vent for 1 month in 2009  Restrictive lung disease due to kyphoscoliosis  on home O2 (2L at night, intermittent during day time)  Small bowel obstruction due to adhesions  08/28/17, treated conservatively Adult Hypothyroidism    Arthrogryposis multiplex congenita    Cataract    Congenital Scoliosis    Hepatitis C  treated with Harvoni, 2015  Hypercarbia    Osteoporosis    Paroxysmal a-fib  01/2017, in the setting of severe pneumonia, resolved  Pneumonia  multiple, last time 01/2017, complicated by A-fib  Rectal cancer  2008, s/p chemo and colon resection  Respiratory failure  was on a Vent for 1 month in 2009  Restrictive lung disease due to kyphoscoliosis  on home O2 with V-PAP machine at 2L at night, intermittent during day time  Small bowel obstruction due to adhesions  08/28/17, treated conservatively Adult Hypothyroidism    Arthrogryposis multiplex congenita    Cataract    Congenital Scoliosis    GERD (gastroesophageal reflux disease)    Hepatitis C  treated with Harvoni, 2015  Hypercarbia    Osteoporosis    Paroxysmal a-fib  01/2017, in the setting of severe pneumonia, resolved  Pneumonia  multiple, last time 01/2017, complicated by A-fib  Rectal cancer  2008, s/p chemo and colon resection  Respiratory failure  was on a Vent for 1 month in 2009, 2015  Restrictive lung disease due to kyphoscoliosis  on home O2 with V-PAP machine at 2L at night, intermittent during day time  Small bowel obstruction due to adhesions  08/28/17, treated conservatively

## 2017-11-10 ENCOUNTER — OUTPATIENT (OUTPATIENT)
Dept: OUTPATIENT SERVICES | Facility: HOSPITAL | Age: 67
LOS: 1 days | End: 2017-11-10
Payer: MEDICARE

## 2017-11-10 ENCOUNTER — APPOINTMENT (OUTPATIENT)
Dept: SURGERY | Facility: HOSPITAL | Age: 67
End: 2017-11-10
Payer: MEDICARE

## 2017-11-10 DIAGNOSIS — Z93.0 TRACHEOSTOMY STATUS: Chronic | ICD-10-CM

## 2017-11-10 DIAGNOSIS — D12.8 BENIGN NEOPLASM OF RECTUM: ICD-10-CM

## 2017-11-10 DIAGNOSIS — Z41.9 ENCOUNTER FOR PROCEDURE FOR PURPOSES OTHER THAN REMEDYING HEALTH STATE, UNSPECIFIED: Chronic | ICD-10-CM

## 2017-11-10 DIAGNOSIS — Z98.1 ARTHRODESIS STATUS: Chronic | ICD-10-CM

## 2017-11-10 DIAGNOSIS — Z98.89 OTHER SPECIFIED POSTPROCEDURAL STATES: Chronic | ICD-10-CM

## 2017-11-10 DIAGNOSIS — Z90.49 ACQUIRED ABSENCE OF OTHER SPECIFIED PARTS OF DIGESTIVE TRACT: Chronic | ICD-10-CM

## 2017-11-10 PROCEDURE — 45378 DIAGNOSTIC COLONOSCOPY: CPT

## 2017-11-10 PROCEDURE — G0121: CPT

## 2017-12-18 PROBLEM — B19.20 UNSPECIFIED VIRAL HEPATITIS C WITHOUT HEPATIC COMA: Chronic | Status: ACTIVE | Noted: 2017-01-09

## 2018-01-08 ENCOUNTER — NON-APPOINTMENT (OUTPATIENT)
Age: 68
End: 2018-01-08

## 2018-01-08 ENCOUNTER — APPOINTMENT (OUTPATIENT)
Dept: CARDIOLOGY | Facility: CLINIC | Age: 68
End: 2018-01-08
Payer: MEDICARE

## 2018-01-08 VITALS
HEART RATE: 69 BPM | DIASTOLIC BLOOD PRESSURE: 67 MMHG | WEIGHT: 80 LBS | SYSTOLIC BLOOD PRESSURE: 178 MMHG | TEMPERATURE: 98.1 F | OXYGEN SATURATION: 94 % | HEIGHT: 58 IN | BODY MASS INDEX: 16.79 KG/M2

## 2018-01-08 VITALS — DIASTOLIC BLOOD PRESSURE: 55 MMHG | SYSTOLIC BLOOD PRESSURE: 138 MMHG

## 2018-01-08 DIAGNOSIS — E03.9 HYPOTHYROIDISM, UNSPECIFIED: ICD-10-CM

## 2018-01-08 PROCEDURE — 99214 OFFICE O/P EST MOD 30 MIN: CPT

## 2018-01-08 PROCEDURE — 93000 ELECTROCARDIOGRAM COMPLETE: CPT

## 2018-03-08 ENCOUNTER — RX RENEWAL (OUTPATIENT)
Age: 68
End: 2018-03-08

## 2018-06-11 ENCOUNTER — APPOINTMENT (OUTPATIENT)
Dept: CARDIOLOGY | Facility: CLINIC | Age: 68
End: 2018-06-11
Payer: MEDICARE

## 2018-06-11 ENCOUNTER — APPOINTMENT (OUTPATIENT)
Dept: CARDIOLOGY | Facility: CLINIC | Age: 68
End: 2018-06-11

## 2018-06-11 VITALS
OXYGEN SATURATION: 96 % | HEIGHT: 58 IN | SYSTOLIC BLOOD PRESSURE: 160 MMHG | HEART RATE: 67 BPM | DIASTOLIC BLOOD PRESSURE: 64 MMHG | TEMPERATURE: 97.7 F | WEIGHT: 82 LBS | BODY MASS INDEX: 17.21 KG/M2

## 2018-06-11 PROCEDURE — 93000 ELECTROCARDIOGRAM COMPLETE: CPT

## 2018-06-11 PROCEDURE — 99214 OFFICE O/P EST MOD 30 MIN: CPT

## 2018-07-16 PROBLEM — C20 MALIGNANT NEOPLASM OF RECTUM: Chronic | Status: ACTIVE | Noted: 2017-11-03

## 2018-07-16 PROBLEM — J98.4 OTHER DISORDERS OF LUNG: Chronic | Status: ACTIVE | Noted: 2017-11-03

## 2018-07-24 PROBLEM — Z78.9 ALCOHOL USE: Status: ACTIVE | Noted: 2017-09-21

## 2018-09-14 PROBLEM — I48.0 PAROXYSMAL ATRIAL FIBRILLATION: Chronic | Status: ACTIVE | Noted: 2017-11-03

## 2018-09-14 PROBLEM — K21.9 GASTRO-ESOPHAGEAL REFLUX DISEASE WITHOUT ESOPHAGITIS: Chronic | Status: ACTIVE | Noted: 2017-11-03

## 2018-09-14 PROBLEM — R06.89 OTHER ABNORMALITIES OF BREATHING: Chronic | Status: ACTIVE | Noted: 2017-11-03

## 2018-09-14 PROBLEM — K56.50 INTESTINAL ADHESIONS [BANDS], UNSPECIFIED AS TO PARTIAL VERSUS COMPLETE OBSTRUCTION: Chronic | Status: ACTIVE | Noted: 2017-11-03

## 2018-10-18 ENCOUNTER — APPOINTMENT (OUTPATIENT)
Dept: GASTROENTEROLOGY | Facility: CLINIC | Age: 68
End: 2018-10-18
Payer: MEDICARE

## 2018-10-18 VITALS
HEART RATE: 78 BPM | RESPIRATION RATE: 15 BRPM | OXYGEN SATURATION: 91 % | BODY MASS INDEX: 15.95 KG/M2 | WEIGHT: 76 LBS | HEIGHT: 58 IN | TEMPERATURE: 97.4 F | DIASTOLIC BLOOD PRESSURE: 80 MMHG | SYSTOLIC BLOOD PRESSURE: 178 MMHG

## 2018-10-18 DIAGNOSIS — Z85.048 PERSONAL HISTORY OF OTHER MALIGNANT NEOPLASM OF RECTUM, RECTOSIGMOID JUNCTION, AND ANUS: ICD-10-CM

## 2018-10-18 LAB
ANION GAP SERPL CALC-SCNC: 14 MMOL/L
BASOPHILS # BLD AUTO: 0.03 K/UL
BASOPHILS NFR BLD AUTO: 0.4 %
BUN SERPL-MCNC: 16 MG/DL
CALCIUM SERPL-MCNC: 10.3 MG/DL
CHLORIDE SERPL-SCNC: 99 MMOL/L
CO2 SERPL-SCNC: 31 MMOL/L
CREAT SERPL-MCNC: 0.51 MG/DL
EOSINOPHIL # BLD AUTO: 0.23 K/UL
EOSINOPHIL NFR BLD AUTO: 3.4 %
GLUCOSE SERPL-MCNC: 101 MG/DL
HCT VFR BLD CALC: 39.1 %
HGB BLD-MCNC: 12.5 G/DL
IMM GRANULOCYTES NFR BLD AUTO: 0.1 %
LYMPHOCYTES # BLD AUTO: 1.09 K/UL
LYMPHOCYTES NFR BLD AUTO: 16 %
MAN DIFF?: NORMAL
MCHC RBC-ENTMCNC: 31.4 PG
MCHC RBC-ENTMCNC: 32 GM/DL
MCV RBC AUTO: 98.2 FL
MONOCYTES # BLD AUTO: 0.96 K/UL
MONOCYTES NFR BLD AUTO: 14.1 %
NEUTROPHILS # BLD AUTO: 4.49 K/UL
NEUTROPHILS NFR BLD AUTO: 66 %
PLATELET # BLD AUTO: 257 K/UL
POTASSIUM SERPL-SCNC: 4.4 MMOL/L
RBC # BLD: 3.98 M/UL
RBC # FLD: 12.9 %
SODIUM SERPL-SCNC: 144 MMOL/L
WBC # FLD AUTO: 6.81 K/UL

## 2018-10-18 PROCEDURE — 99214 OFFICE O/P EST MOD 30 MIN: CPT

## 2018-10-23 ENCOUNTER — OTHER (OUTPATIENT)
Age: 68
End: 2018-10-23

## 2018-10-24 ENCOUNTER — CLINICAL ADVICE (OUTPATIENT)
Age: 68
End: 2018-10-24

## 2018-10-29 ENCOUNTER — APPOINTMENT (OUTPATIENT)
Dept: MRI IMAGING | Facility: CLINIC | Age: 68
End: 2018-10-29

## 2018-10-30 ENCOUNTER — OTHER (OUTPATIENT)
Age: 68
End: 2018-10-30

## 2018-10-30 DIAGNOSIS — K52.9 NONINFECTIVE GASTROENTERITIS AND COLITIS, UNSPECIFIED: ICD-10-CM

## 2018-11-02 ENCOUNTER — APPOINTMENT (OUTPATIENT)
Dept: CT IMAGING | Facility: HOSPITAL | Age: 68
End: 2018-11-02

## 2018-11-02 ENCOUNTER — OUTPATIENT (OUTPATIENT)
Dept: OUTPATIENT SERVICES | Facility: HOSPITAL | Age: 68
LOS: 1 days | End: 2018-11-02
Payer: MEDICARE

## 2018-11-02 DIAGNOSIS — Z98.89 OTHER SPECIFIED POSTPROCEDURAL STATES: Chronic | ICD-10-CM

## 2018-11-02 DIAGNOSIS — Z41.9 ENCOUNTER FOR PROCEDURE FOR PURPOSES OTHER THAN REMEDYING HEALTH STATE, UNSPECIFIED: Chronic | ICD-10-CM

## 2018-11-02 DIAGNOSIS — Z90.49 ACQUIRED ABSENCE OF OTHER SPECIFIED PARTS OF DIGESTIVE TRACT: Chronic | ICD-10-CM

## 2018-11-02 DIAGNOSIS — Z93.0 TRACHEOSTOMY STATUS: Chronic | ICD-10-CM

## 2018-11-02 DIAGNOSIS — Z00.00 ENCOUNTER FOR GENERAL ADULT MEDICAL EXAMINATION WITHOUT ABNORMAL FINDINGS: ICD-10-CM

## 2018-11-02 DIAGNOSIS — Z98.1 ARTHRODESIS STATUS: Chronic | ICD-10-CM

## 2018-11-02 DIAGNOSIS — K56.609 UNSPECIFIED INTESTINAL OBSTRUCTION, UNSPECIFIED AS TO PARTIAL VERSUS COMPLETE OBSTRUCTION: ICD-10-CM

## 2018-11-02 PROCEDURE — 74176 CT ABD & PELVIS W/O CONTRAST: CPT | Mod: 26

## 2018-11-02 PROCEDURE — 74176 CT ABD & PELVIS W/O CONTRAST: CPT

## 2018-11-06 ENCOUNTER — OTHER (OUTPATIENT)
Age: 68
End: 2018-11-06

## 2018-12-10 ENCOUNTER — APPOINTMENT (OUTPATIENT)
Dept: CARDIOLOGY | Facility: CLINIC | Age: 68
End: 2018-12-10
Payer: MEDICARE

## 2018-12-10 ENCOUNTER — NON-APPOINTMENT (OUTPATIENT)
Age: 68
End: 2018-12-10

## 2018-12-10 VITALS
OXYGEN SATURATION: 91 % | HEIGHT: 58 IN | DIASTOLIC BLOOD PRESSURE: 61 MMHG | WEIGHT: 80 LBS | BODY MASS INDEX: 16.79 KG/M2 | SYSTOLIC BLOOD PRESSURE: 167 MMHG | HEART RATE: 72 BPM

## 2018-12-10 DIAGNOSIS — Z87.09 PERSONAL HISTORY OF OTHER DISEASES OF THE RESPIRATORY SYSTEM: ICD-10-CM

## 2018-12-10 DIAGNOSIS — Z92.29 PERSONAL HISTORY OF OTHER DRUG THERAPY: ICD-10-CM

## 2018-12-10 PROCEDURE — 93000 ELECTROCARDIOGRAM COMPLETE: CPT

## 2018-12-10 PROCEDURE — 99214 OFFICE O/P EST MOD 30 MIN: CPT

## 2018-12-10 NOTE — DISCUSSION/SUMMARY
[FreeTextEntry1] : 68 year-old woman who developed PAF in the setting of severe PNA. \par As there is no further evidence of atrial fibrillation, it is most likely that the episode was provoked in the setting of an acute pneumonia. No further anticoagulation at this time.\par \par Continue diltiazem for now for rate and rhythm control - also has benefits as antihypertensive, although patient reports significant white coat hypertension. Unless patient develops symptoms of dizziness, fatigue (other than that of recovering from PNA), or bradycardia, would plan to continue diltiazem indefinitely to reduce likelihood of future PAF. Patient is not ideal candidate for beta-blocker or amiodarone because of restrictive lung disease.\par \par Patient will have repeat TTE to evaluate LV function and pulmonary pressures.\par If TTE is at baseline, follow-up with me in six months or earlier as needed.\par Follow-up with Dr. Hu as scheduled.

## 2018-12-10 NOTE — REASON FOR VISIT
[Follow-Up - From Hospitalization] : follow-up of a recent hospitalization for [Atrial Fibrillation] : atrial fibrillation [Discharge Date: ___] : Discharge Date: [unfilled] [Admitted for Heart Failure] : patient was not admitted for heart failure [FreeTextEntry2] : PNA [Spouse] : spouse

## 2018-12-10 NOTE — PHYSICAL EXAM
[General Appearance - In No Acute Distress] : no acute distress [Normal Conjunctiva] : the conjunctiva exhibited no abnormalities [Eyelids - No Xanthelasma] : the eyelids demonstrated no xanthelasmas [Normal Oral Mucosa] : normal oral mucosa [No Oral Pallor] : no oral pallor [No Oral Cyanosis] : no oral cyanosis [Normal Oropharynx] : normal oropharynx [] : no respiratory distress [Respiration, Rhythm And Depth] : normal respiratory rhythm and effort [Exaggerated Use Of Accessory Muscles For Inspiration] : no accessory muscle use [Auscultation Breath Sounds / Voice Sounds] : lungs were clear to auscultation bilaterally [Heart Rate And Rhythm] : heart rate and rhythm were normal [Heart Sounds] : normal S1 and S2 [Murmurs] : no murmurs present [Arterial Pulses Normal] : the arterial pulses were normal [Edema] : no peripheral edema present [Bowel Sounds] : normal bowel sounds [Abdomen Soft] : soft [Abdomen Tenderness] : non-tender [FreeTextEntry1] : diffuse skeletal and joint deformities [Nail Clubbing] : no clubbing of the fingernails [Cyanosis, Localized] : no localized cyanosis [Skin Color & Pigmentation] : normal skin color and pigmentation [No Venous Stasis] : no venous stasis [No Skin Ulcers] : no skin ulcer [No Xanthoma] : no  xanthoma was observed [Oriented To Time, Place, And Person] : oriented to person, place, and time [Impaired Insight] : insight and judgment were intact [Affect] : the affect was normal [Mood] : the mood was normal

## 2018-12-14 ENCOUNTER — APPOINTMENT (OUTPATIENT)
Dept: CARDIOLOGY | Facility: CLINIC | Age: 68
End: 2018-12-14
Payer: MEDICARE

## 2018-12-14 PROCEDURE — 93306 TTE W/DOPPLER COMPLETE: CPT

## 2019-02-01 NOTE — PATIENT PROFILE ADULT. - PATIENT REPRESENTATIVE: ( YOU CAN CHOOSE ANY PERSON THAT CAN ASSIST YOU WITH YOUR HEALTH CARE PREFERENCES, DOES NOT HAVE TO BE A SPOUSE, IMMEDIATE FAMILY OR SIGNIFICANT OTHER/PARTNER)
Nephrology Progress Note  02/01/2019         Mrs Rocha is an 80 yof w/CKD III, Carotid stenosis, GERD, HTN, CAD who had emergent CABG x3 on 1/15, Nephrology consulted for NATALIA on CKD, started HD on 1/21.       Interval History :   Mrs Rocha had HD yesterday, pulled 1.5L but had some issues with a-fib w/RVR, on esmolol gtt and back in SR today.  No issues pushing for HD run but will run to logistically get on MWF schedule, nearing discharge to TCU which will likely run on MWF, has trach/PEG/tunneled line.  A bit hypertensive even with esmolol gtt, may be indicative of some hypervolemia as wt is up despite being net negative.       Assessment & Recommendations:   NATALIA on CKD-CKD 3, baseline Cr of ~2.0 with one atrophic kidney on imaging with no former imaging to compare.  Injury involving hemodynamics with CABG as well as contrast for angiogram prior.  Had 2 sessions of HD without UF and did reasonably well but then had PEA arrest and was placed on CRRT to stabilize electrolytes and prevent worsening volume overload.  Anuric since arrest, recovery unlikely but can not be ruled out.       -CRRT stopped 1/25, transitioned to iHD, running today to logistically get back to MWF schedule.                -Appreciate IR placing tunneled line 1/30.      Volume status-Planning HD today mainly due to logistics with HD unit and to get her on MWF as most TCU's run on MWF schedule.  Pulling 2-3L as able with some hypertension.       Electrolytes/pH-K 3.6, bicarb 23, no acute issues.       Ca/phos/pth-Ca 8.1 Phos 8.3 last check, running HD today.  -On Nepro TF.      CABG-Was recovering although needed HD for NATALIA but had PEA arrest evening of 1/22, started on CRRT but again transitioned to iHD.  Now with trach/PEG/tunnled line, nearing discharge to TCU depending on GI issues.       Anemia-Hgb 8.1, following.       Nutrition-On Nepro TF but held due to N/V issues yesterday, planning on restarting.       Seen and discussed with   April.      Recommendations were communicated to primary team via this note.        Quincy Jesus  Clinical Nurse Specialist  [phone]    Review of Systems:   I reviewed the following systems:  ROS not done due to vent/sedation.     Physical Exam:   I/O last 3 completed shifts:  In: 1804.21 [I.V.:1234.21; NG/GT:170]  Out: 2200 [Emesis/NG output:700; Other:1500]   /76   Pulse 92   Temp 98.3  F (36.8  C) (Axillary)   Resp 14   Wt 78.1 kg (172 lb 2.9 oz)   SpO2 96%   BMI 30.50 kg/m       GENERAL APPEARANCE: Intubated, sedated.    EYES:  No scleral icterus, pupils equal  HENT: mouth without ulcers or lesions  PULM: lungs rhonchi to auscultation  CV: irregular rhythm, normal rate, no rub     -edema +1LE  GI: soft, non-tender, non-distended, bowel sounds are +  MS: no evidence of inflammation in joints, no muscle tenderness  NEURO: Awake but minimally interactive, moving extremities, No asterixis   Lines: 1/30 Tunneled line.     Labs:   All labs reviewed by me  Electrolytes/Renal -   Recent Labs   Lab Test 01/31/19  0357 01/30/19  1227 01/30/19  0402 01/29/19  0408  01/27/19  1830 01/27/19  0404  01/26/19  0957     --  134 133   < >  --  136  --  137   POTASSIUM 3.6 3.6 3.2* 3.6   < > 3.4 3.4   < > 3.5   CHLORIDE 97  --  98 98   < >  --  101  --  103   CO2 23  --  25 23   < >  --  19*  --  22   BUN 69*  --  51* 51*   < >  --  60*  --  42*   CR 4.07*  --  2.92* 3.27*   < >  --  3.53*  --  2.55*   GLC 99  --  119* 127*   < >  --  125*  --  115*   JEFFREY 7.5*  --  7.7* 7.8*   < >  --  7.7*  --  7.0*   MAG 2.3  --   --   --   --  2.8* 2.6*  --  2.4*   PHOS  --   --   --   --   --  8.3* 8.0*  --  6.5*    < > = values in this interval not displayed.       CBC -   Recent Labs   Lab Test 01/29/19  0408 01/28/19  0335 01/27/19  0404   WBC 10.4 11.7* 11.7*   HGB 8.1* 7.8* 7.8*    246 190       LFTs -   Recent Labs   Lab Test 01/27/19  0404 01/26/19  0957 01/26/19  0425   ALKPHOS 76 76 71   BILITOTAL 0.9  0.9 0.8   ALT 14 20 19   AST 30 39 42   PROTTOTAL 5.1* 5.1* 5.1*   ALBUMIN 1.8* 1.9* 1.9*       Iron Panel -   Recent Labs   Lab Test 01/27/15  1218 07/11/12  0804   IRON 74 80   IRONSAT 21 26   ORQUIDEA 40 67           Current Medications:    sodium chloride 0.9%  250 mL Intravenous Once in dialysis     sodium chloride 0.9%  300 mL Hemodialysis Machine Once     artificial tears   Both Eyes Daily     aspirin  81 mg Oral or Feeding Tube Daily     atorvastatin  40 mg Oral QPM     B and C vitamin Complex with folic acid  5 mL Per Feeding Tube Daily     busPIRone  5 mg Oral or Feeding Tube BID     heparin Lock (1000 units/mL High concentration)  3 mL Intracatheter Once     heparin Lock (1000 units/mL High concentration)  3 mL Intracatheter Once     heparin lock flush  5-10 mL Intracatheter Q24H     heparin  5,000 Units Subcutaneous Q8H FAISAL     insulin aspart  1-6 Units Subcutaneous Q4H     labetalol  10 mg Intravenous Q6H     - MEDICATION INSTRUCTIONS -   Does not apply Once     pantoprazole (PROTONIX) IV  40 mg Intravenous Daily with breakfast     polyethylene glycol  17 g Oral or Feeding Tube Daily     protein modular  1 packet Per Feeding Tube BID     QUEtiapine  50 mg Oral At Bedtime     senna-docusate  1 tablet Oral or Feeding Tube BID    Or     senna-docusate  2 tablet Oral or Feeding Tube BID     sodium chloride (PF)  3 mL Intracatheter Q8H     sodium chloride (PF)  9 mL Intracatheter During Hemodialysis (from stock)     sodium chloride (PF)  9 mL Intracatheter During Hemodialysis (from stock)       amiodarone 0.5 mg/min (02/01/19 1000)     IV fluid REPLACEMENT ONLY       IV fluid REPLACEMENT ONLY       esmolol 150 mcg/kg/min (02/01/19 1011)       I, Simon Chen, evaluated this patient with Quincy Jesus, Clinical Nurse Specialist, 02/01/19, as part of a shared visit.     I personally reviewed major complaints, physical findings, investigations, medications and the overall management plan.        My key  findings: dialysis-dependent kidney failure    Key management decisions made by me: continue scheduled renal replacement therapy.       Yes

## 2019-03-04 ENCOUNTER — RX RENEWAL (OUTPATIENT)
Age: 69
End: 2019-03-04

## 2019-03-18 NOTE — PATIENT PROFILE ADULT. - HEALTHCARE QUESTIONS, PROFILE
----- Message from Bernie Burr sent at 3/18/2019  8:04 AM CDT -----  Contact: self  Rhoda Mendoza  MRN: [id number]  Home Phone      [phone]  Work Phone      Not on file.  Mobile          [phone]    Patient Care Team:  Primary Doctor No as PCP - General  Sushma Torres MD as Consulting Physician (Obstetrics and Gynecology)  OB? No  What phone number can you be reached at? [phone]  Message:  Pt states her cycle started yesterday and that she is in a lot of pain and can barely move out of the bed. Pt would like to know if she can be seen today if possible. Please advise. Thanks.   na

## 2019-04-24 NOTE — PHYSICAL THERAPY INITIAL EVALUATION ADULT - STAIR PATTERN, REHAB EVAL
Airway patent, Nasal mucosa clear. Mouth with normal mucosa. Throat has no vesicles, no oropharyngeal exudates and uvula is midline.
step to step

## 2019-06-03 NOTE — PATIENT PROFILE ADULT. - MENTAL HEALTH CONDITIONS/SYMPTOMS, PROFILE
Diagnosis    Closed fracture of neck of right femur ([facility] [address] [S72.001A]     PLAN:    Femoral neck fracture  Postoperative, doing fine  No new issues  Perioperative care by orthopedic surgery      Left arm swelling  Venous Dopplers are negative for vein thrombosis  Elevate extremities, supportive care    Hypertension  Controlled blood pressure noted. Continue same medications    Dyslipidemia  Continue simvastatin    Discussed with nursing    DVT Prophylaxis: Lovenox  Diet: DIET GENERAL;  Code Status: Full Code    PT/OT Eval Status: In process    Dispo - discharged to skilled nursing facility when stable.     Adriana Méndez MD none

## 2019-06-11 ENCOUNTER — NON-APPOINTMENT (OUTPATIENT)
Age: 69
End: 2019-06-11

## 2019-06-11 ENCOUNTER — APPOINTMENT (OUTPATIENT)
Dept: CARDIOLOGY | Facility: CLINIC | Age: 69
End: 2019-06-11
Payer: MEDICARE

## 2019-06-11 VITALS
WEIGHT: 78 LBS | SYSTOLIC BLOOD PRESSURE: 164 MMHG | OXYGEN SATURATION: 93 % | HEART RATE: 72 BPM | HEIGHT: 58 IN | DIASTOLIC BLOOD PRESSURE: 64 MMHG | BODY MASS INDEX: 16.37 KG/M2

## 2019-06-11 DIAGNOSIS — E87.5 HYPERKALEMIA: ICD-10-CM

## 2019-06-11 PROCEDURE — 93000 ELECTROCARDIOGRAM COMPLETE: CPT

## 2019-06-11 PROCEDURE — 99214 OFFICE O/P EST MOD 30 MIN: CPT

## 2019-06-11 NOTE — REASON FOR VISIT
[Follow-Up - From Hospitalization] : follow-up of a recent hospitalization for [Atrial Fibrillation] : atrial fibrillation [Discharge Date: ___] : Discharge Date: [unfilled] [Spouse] : spouse [FreeTextEntry2] : PNA [Admitted for Heart Failure] : patient was not admitted for heart failure

## 2019-06-11 NOTE — PHYSICAL EXAM
[Normal Conjunctiva] : the conjunctiva exhibited no abnormalities [General Appearance - In No Acute Distress] : no acute distress [Normal Oral Mucosa] : normal oral mucosa [Eyelids - No Xanthelasma] : the eyelids demonstrated no xanthelasmas [No Oral Pallor] : no oral pallor [Normal Oropharynx] : normal oropharynx [No Oral Cyanosis] : no oral cyanosis [] : no respiratory distress [Respiration, Rhythm And Depth] : normal respiratory rhythm and effort [Exaggerated Use Of Accessory Muscles For Inspiration] : no accessory muscle use [Auscultation Breath Sounds / Voice Sounds] : lungs were clear to auscultation bilaterally [Heart Sounds] : normal S1 and S2 [Murmurs] : no murmurs present [Heart Rate And Rhythm] : heart rate and rhythm were normal [Arterial Pulses Normal] : the arterial pulses were normal [Edema] : no peripheral edema present [Bowel Sounds] : normal bowel sounds [Abdomen Tenderness] : non-tender [Abdomen Soft] : soft [Cyanosis, Localized] : no localized cyanosis [Nail Clubbing] : no clubbing of the fingernails [Skin Color & Pigmentation] : normal skin color and pigmentation [No Skin Ulcers] : no skin ulcer [No Venous Stasis] : no venous stasis [No Xanthoma] : no  xanthoma was observed [Impaired Insight] : insight and judgment were intact [Oriented To Time, Place, And Person] : oriented to person, place, and time [Affect] : the affect was normal [Mood] : the mood was normal [FreeTextEntry1] : wearing nasal canula

## 2019-06-11 NOTE — HISTORY OF PRESENT ILLNESS
[FreeTextEntry1] : Patient is a 69 year-old woman with a history of arthrogryposis, multiple joint deformities for which she had pediatric surgeries including blood transfusions from which she contracted HCV. Her orthopedic condition has resulted in kyphoscoliosis and restrictive lung disease. She was recently admitted to [facility] with severe pneumonia and in the setting of the pneumonia, she experienced paroxysms of AFib with RVR and associated chest discomfort. She was started on apixaban for anticoagulation and diltiazem for rate and rhythm control. Neither beta-blockers nor amiodarone would have been ideal choices given her restrictive airway disease. She was discharged home on those medications with new home oxygen and CPAP at night, and she completed three weeks of CardioNet monitor that was put on prior to discharge.\par There was no evidence of atrial fibrillation on the monitor, and she has remained symptom free.\par \par Hospitalized in [place], [place] recently with small bowel obstruction that corrected with NG tube decompression. She never went back into AFib. No surgical intervention was necessary. She has since been seen by her GI (Shar Givens MD) and colorectal surgeon (Hari Gonzalez MD) who agree that nothing further needs to be done at this time.\par \par EKG today shows normal sinus rhythm.\par \par 12/10/2018 - Patient had an upper airway infection over the Thanksgiving Holiday. She was prescribed doxycycline, which expedited her improvement in symptoms. No further cough.\par \par June 2019 - Patient wore a 24 hour blood pressure monitor and was seen to be hypertensive. She was started on lisinopril 10 mg daily in addition to the diltiazem 120 mg daily. She was also started on Prolia.\par She had repeat blood work with her internist. \par \par PMD: Anitra Woodward MD [phone]\par Pulmonologist: Sujit Hu MD [phone]\par Hepatologist: Zachary Hoffmann MD [phone]

## 2019-06-11 NOTE — DISCUSSION/SUMMARY
[FreeTextEntry1] : 69 year-old woman who developed PAF in the setting of severe PNA. \par As there is no further evidence of atrial fibrillation, it is most likely that the episode was provoked in the setting of an acute pneumonia. No further anticoagulation at this time.\par \par Continue diltiazem for now for rate and rhythm control - also has benefits as antihypertensive, although patient reports significant white coat hypertension. Unless patient develops symptoms of dizziness, fatigue (other than that of recovering from PNA), or bradycardia, would plan to continue diltiazem indefinitely to reduce likelihood of future PAF. Patient is not ideal candidate for beta-blocker or amiodarone because of restrictive lung disease.\par Continue ACEi as prescribed by her primary care physician. If she develops a dry cough, would change to ARB.\par \par Follow-up with Dr. Hu as scheduled.

## 2019-07-18 ENCOUNTER — OTHER (OUTPATIENT)
Age: 69
End: 2019-07-18

## 2019-07-30 ENCOUNTER — APPOINTMENT (OUTPATIENT)
Dept: GASTROENTEROLOGY | Facility: CLINIC | Age: 69
End: 2019-07-30
Payer: MEDICARE

## 2019-07-30 VITALS
RESPIRATION RATE: 15 BRPM | HEART RATE: 80 BPM | DIASTOLIC BLOOD PRESSURE: 68 MMHG | OXYGEN SATURATION: 94 % | TEMPERATURE: 97.2 F | SYSTOLIC BLOOD PRESSURE: 138 MMHG | BODY MASS INDEX: 15.95 KG/M2 | HEIGHT: 58 IN | WEIGHT: 76 LBS

## 2019-07-30 DIAGNOSIS — C20 MALIGNANT NEOPLASM OF RECTUM: ICD-10-CM

## 2019-07-30 PROCEDURE — 99214 OFFICE O/P EST MOD 30 MIN: CPT

## 2019-07-30 RX ORDER — LORAZEPAM 0.5 MG/1
0.5 TABLET ORAL
Qty: 1 | Refills: 0 | Status: DISCONTINUED | COMMUNITY
Start: 2018-10-23 | End: 2019-07-30

## 2019-07-30 NOTE — HISTORY OF PRESENT ILLNESS
[FreeTextEntry1] : Follow up 10/2018\par Plan after last visit:\par Nausea & vomiting (787.01) (R11.2)\par Small bowel obstruction (560.9) (K56.609)\par History of rectal cancer (V10.06) (Z85.048)\par COPD (chronic obstructive pulmonary disease) (496) (J44.9)\par \par 68 yo F pmh colon cancer s/p colectomy and primary anastomosis, h/o HCV s/p treatment with SVR, and h/o SBO treated conservatively presents for follow up. Unclear what the cause of these intermittent episodes of n/v are, though given history concern for partial vs. intermittent sbo. Colon did not show any clear anastomotic stricture. Will assess with enterography as next step. If negative, to consider EGD. Given allergy to IV contrast with prior CT, will pursue MRE\par \par Impression:\par 1) Recurrent, intermittent nausea/vomiting\par 2) H/o SBO\par 3) H/o Colon cancer - UTD surveillance\par 4) Minimal GERD\par 5) HCV s/p SVR\par \par Plan:\par 1) MR-Enterography\par 2) Pending above - to consider EGD. Would need PST prior\par 3) Get BMP to confirm Cr safe for MRI \par 4) Plan agreed upon in full. All questions answered. \par 5) CC: Hari Gonzalez. \par 6) RV pending above. \par ----------------------------------------------------------------------------------\par Since last visit:\par Had CT with PO contrast (unable to get MRE, CTE) - no obstruction but some ileus of small bowel\par \par Currently:\par Doing well overall\par Day to Day she is fine\par Every 1-2 moths she gets obstructive symptoms that self resolve in 24-48 hours\par Resolves in 3-4 days\par No hospital admission since last visit\par Has some increased bloating and gas, but otherwise okay\par No N/V/Abdominal Pain/distension\par + Flatus, + Good BM\par \par \par H/o Colon CA - Due for colon 2020\par occurs in a cycle 3-5 days and then self resolves\par + Flatus  - doing well on Florastor\par \par \par -------------------------------------------------------\par Previous Workup:\par CT Enterography - 2019\par IMPRESSION: \par Multiple dilated fluid-filled loops of small bowel without discrete \par transition point are suggestive of ileus. \par \par Colon 11/2017 (Hari Gonzalez)\par Normal anus - ileum

## 2019-07-30 NOTE — PHYSICAL EXAM
[General Appearance - In No Acute Distress] : in no acute distress [General Appearance - Alert] : alert [General Appearance - Well Nourished] : well nourished [Sclera] : the sclera and conjunctiva were normal [Strabismus] : no strabismus was seen [Extraocular Movements] : extraocular movements were intact [Examination Of The Oral Cavity] : the lips and gums were normal [Oropharynx] : the oropharynx was normal [Outer Ear] : the ears and nose were normal in appearance [Neck Appearance] : the appearance of the neck was normal [Thyroid Diffuse Enlargement] : the thyroid was not enlarged [Exaggerated Use Of Accessory Muscles For Inspiration] : no accessory muscle use [Respiration, Rhythm And Depth] : normal respiratory rhythm and effort [Thyroid Nodule] : there were no palpable thyroid nodules [Heart Rate And Rhythm] : heart rate was normal and rhythm regular [Auscultation Breath Sounds / Voice Sounds] : lungs were clear to auscultation bilaterally [Heart Sounds] : normal S1 and S2 [Murmurs] : no murmurs [Edema] : there was no peripheral edema [Abdomen Tenderness] : non-tender [Bowel Sounds] : normal bowel sounds [Abdomen Soft] : soft [Abdomen Hernia] : no hernia was discovered [Abdomen Mass (___ Cm)] : no abdominal mass palpated [No CVA Tenderness] : no ~M costovertebral angle tenderness [Involuntary Movements] : no involuntary movements were seen [] : no rash [Skin Lesions] : no skin lesions [FreeTextEntry1] : aox3 [Impaired Insight] : insight and judgment were intact [Affect] : the affect was normal

## 2019-07-30 NOTE — ASSESSMENT
[FreeTextEntry1] : 70 yo F pmh colon cancer s/p colectomy and primary anastomosis, h/o HCV s/p treatment with SVR, and h/o SBO treated conservatively presents for follow up.  She appears to be getting intermittent pSBO vs. SB ileus.  She self manages with conservative care and is doing well overall.  There is no intervenable target on imaging.  As such the mainstay of therapy is supportive, which is amenable to.   She is due for CRC Screening next year, but otherwise is doing well.\par \par Impression:\par 1) Recurrent, intermittent nausea/vomiting - likely 2/2 pSBO or SB Ileus\par 2) H/o high grade SBO\par 3) H/o Colon cancer - due 2020\par 4) Minimal GERD\par 5) HCV s/p SVR\par 6) Bloating\par \par Plan:\par 1) Low FODMAP diet\par 2) Continue with probiotic\par 3) Okay with PRN peppermint oil vs. Gas X\par 4) Supportive care if partial obstructive symptom arise, but we reviewed warning signs of complete instruction and why she would need to go to the ED.\par 5) Colon 2020\par 6) CC: Hari Gonzalez. \par 7) All discussed at length.  All questions answered.  Plan agreed upon\par 8) RV pending above

## 2019-08-20 NOTE — H&P ADULT. - HISTORY OF PRESENT ILLNESS
67 y/o female with hx of colon ca s/p J pouch , hypothyroid , arthyrogryprosis,  restrictive lung diease sec to kyphoscolosis chronically on home nocturnal o2 but occasionally during day  , hitory of respiratory failure in past s/p tach and peg . pt now admitted with worsening sob and cough / back pain and increasing use of O2 to needing it during dya  . no fever or chills   CXR done as o/p suggestive of pna   pt was seen in ER by Dr. Brooks from pul :  acute on chronic respiratory failure ( sec to restrictive lung diseae ) with decompensation  sec to PNA
Detail Level: Simple
Concentration Of Solution Injected (Mg/Ml): 40.0
Consent: The risks of atrophy were reviewed with the patient.
Include Z78.9 (Other Specified Conditions Influencing Health Status) As An Associated Diagnosis?: No
Administered By (Optional): Dr Barr
X Size Of Lesion In Cm (Optional): 0
Treatment Number (Optional): 1
Total Volume Injected (Ccs- Only Use Numbers And Decimals): 1.0
Kenalog Preparation: Kenalog
Medical Necessity Clause: This procedure was medically necessary because the lesions that were treated were:

## 2019-12-30 NOTE — ED PROVIDER NOTE - CONSTITUTIONAL MENTATION
0730: Bedside and Verbal shift change report given to Carla [facility] and Og Matias RN (oncoming nurse) by Margarito Em RN (offgoing nurse). Report included the following information SBAR, Kardex, Intake/Output, MAR, Accordion, Recent Results and Cardiac Rhythm paced. 1030: driveline dressing change completed    1045: patient up in chair position in bed, patient denies dizziness or lightheadedness. Will continue to monitor for orthostatic hypotension    1110: PT at bedside with RN for orthostatic blood pressure. Lying MAP 78, sitting MAP 70, standing MAP 50 and patient stated he felt dizzy and \"like he was going to pass out\" patient had no loss of consciousness and patient was returned to bed. Patient denies dizziness once returned to bed. Heart failure NP made aware, plan to hold PM dose of bumex and give albumin. 1715: distal end of sternotomy dressing changed per order      Cardiac Surgery Shift Summary:    1. I/O's: Intake:   Meals: 25-50% of each meal (fair)     Output: Other (comment) UTI, frequent small amounts of urine   Has patient had BM since surgery? Yes    2. Oxygen Requirements: Patient on 1 L O2 (stable)    3. Daily Weights (weight change in 24 hours): Weight loss (comment) 2kg    4. Medication Administration: Medication(s) held due to vital sign(s) (comment required) provider held pm dose of bumex d/t orthostatic hypotension       1930: Bedside and Verbal shift change report given to Celi Simon RN (oncoming nurse) by KATRIN Aguirre (offgoing nurse). Report included the following information SBAR, Kardex, Intake/Output, MAR, Accordion, Recent Results and Cardiac Rhythm paced. Problem: Falls - Risk of  Goal: *Absence of Falls  Description  Document [facility] Fall Risk and appropriate interventions in the flowsheet.   Outcome: Progressing Towards Goal  Note: Fall Risk Interventions:  Mobility Interventions: Communicate number of staff needed for ambulation/transfer, OT consult for ADLs, Patient to call before getting OOB, PT Consult for mobility concerns, PT Consult for assist device competence    Mentation Interventions: Adequate sleep, hydration, pain control, Door open when patient unattended, More frequent rounding, Evaluate medications/consider consulting pharmacy, Room close to nurse's station, Update white board    Medication Interventions: Evaluate medications/consider consulting pharmacy, Patient to call before getting OOB, Teach patient to arise slowly    Elimination Interventions: Call light in reach, Patient to call for help with toileting needs, Toileting schedule/hourly rounds    History of Falls Interventions: Consult care management for discharge planning, Evaluate medications/consider consulting pharmacy, Door open when patient unattended, Room close to nurse's station         Problem: Patient Education: Go to Patient Education Activity  Goal: Patient/Family Education  Outcome: Progressing Towards Goal     Problem: Pain  Goal: *Control of Pain  Outcome: Progressing Towards Goal     Problem: Patient Education: Go to Patient Education Activity  Goal: Patient/Family Education  Outcome: Progressing Towards Goal     Problem: Pressure Injury - Risk of  Goal: *Prevention of pressure injury  Description  Document Mich Scale and appropriate interventions in the flowsheet.   Outcome: Progressing Towards Goal  Note: Pressure Injury Interventions:  Sensory Interventions: Assess changes in LOC, Discuss PT/OT consult with provider, Keep linens dry and wrinkle-free, Maintain/enhance activity level, Minimize linen layers    Moisture Interventions: Absorbent underpads, Apply protective barrier, creams and emollients, Minimize layers    Activity Interventions: Increase time out of bed, Pressure redistribution bed/mattress(bed type), PT/OT evaluation    Mobility Interventions: Chair cushion, Pressure redistribution bed/mattress (bed type), PT/OT evaluation    Nutrition Interventions: Document food/fluid/supplement intake    Friction and Shear Interventions: Apply protective barrier, creams and emollients, Lift sheet, Minimize layers                Problem: Patient Education: Go to Patient Education Activity  Goal: Patient/Family Education  Outcome: Progressing Towards Goal     Problem: Heart Failure: Discharge Outcomes  Goal: *Demonstrates ability to perform prescribed activity without shortness of breath or discomfort  Outcome: Progressing Towards Goal  Goal: *Left ventricular function assessment completed prior to or during stay, or planned for post-discharge  Outcome: Progressing Towards Goal  Goal: *ACEI prescribed if LVEF less than 40% and no contraindications or ARB prescribed  Outcome: Progressing Towards Goal  Goal: *Verbalizes understanding and describes prescribed diet  Outcome: Progressing Towards Goal  Goal: *Verbalizes understanding/describes prescribed medications  Outcome: Progressing Towards Goal  Goal: *Describes available resources and support systems  Description  (eg: Home Health, Palliative Care, Advanced Medical Directive)  Outcome: Progressing Towards Goal  Goal: *Describes smoking cessation resources  Outcome: Progressing Towards Goal  Goal: *Understands and describes signs and symptoms to report to providers(Stroke Metric)  Outcome: Progressing Towards Goal  Goal: *Describes/verbalizes understanding of follow-up/return appt  Description  (eg: to physicians, diabetes treatment coordinator, and other resources  Outcome: Progressing Towards Goal  Goal: *Describes importance of continuing daily weights and changes to report to physician  Outcome: Progressing Towards Goal     Problem: Diabetes Self-Management  Goal: *Disease process and treatment process  Description  Define diabetes and identify own type of diabetes; list 3 options for treating diabetes.   Outcome: Progressing Towards Goal  Goal: *Incorporating nutritional management into lifestyle  Description  Describe effect of type, amount and timing of food on blood glucose; list 3 methods for planning meals. Outcome: Progressing Towards Goal  Goal: *Incorporating physical activity into lifestyle  Description  State effect of exercise on blood glucose levels. Outcome: Progressing Towards Goal  Goal: *Developing strategies to promote health/change behavior  Description  Define the ABC's of diabetes; identify appropriate screenings, schedule and personal plan for screenings. Outcome: Progressing Towards Goal  Goal: *Using medications safely  Description  State effect of diabetes medications on diabetes; name diabetes medication taking, action and side effects. Outcome: Progressing Towards Goal  Goal: *Monitoring blood glucose, interpreting and using results  Description  Identify recommended blood glucose targets  and personal targets. Outcome: Progressing Towards Goal  Goal: *Prevention, detection, treatment of acute complications  Description  List symptoms of hyper- and hypoglycemia; describe how to treat low blood sugar and actions for lowering  high blood glucose level. Outcome: Progressing Towards Goal  Goal: *Prevention, detection and treatment of chronic complications  Description  Define the natural course of diabetes and describe the relationship of blood glucose levels to long term complications of diabetes.   Outcome: Progressing Towards Goal     Problem: Patient Education: Go to Patient Education Activity  Goal: Patient/Family Education  Outcome: Progressing Towards Goal     Problem: Discharge Planning  Goal: *Discharge to safe environment  Outcome: Progressing Towards Goal     Problem: Patient Education: Go to Patient Education Activity  Goal: Patient/Family Education  Outcome: Progressing Towards Goal     Problem: Patient Education: Go to Patient Education Activity  Goal: Patient/Family Education  Outcome: Progressing Towards Goal     Problem: Nutrition Deficit  Goal: *Optimize nutritional status  Outcome: Progressing Towards Goal     Problem: Infection - Risk of, Urinary Catheter-Associated Urinary Tract Infection  Goal: *Absence of infection signs and symptoms  Outcome: Progressing Towards Goal     Problem: Patient Education: Go to Patient Education Activity  Goal: Patient/Family Education  Outcome: Progressing Towards Goal     Problem: LVAD Post-op Day 15 to Discharge  Goal: Off Pathway (Use only if patient is Off Pathway)  Outcome: Progressing Towards Goal  Goal: Activity/Safety  Outcome: Progressing Towards Goal  Goal: Consults, if ordered  Outcome: Progressing Towards Goal  Goal: Diagnostic Test/Procedures  Outcome: Progressing Towards Goal  Goal: Nutrition/Diet  Outcome: Progressing Towards Goal  Goal: Medications  Outcome: Progressing Towards Goal  Goal: Discharge Planning  Outcome: Progressing Towards Goal  Goal: Respiratory  Outcome: Progressing Towards Goal  Goal: Treatments/Interventions/Procedures  Outcome: Progressing Towards Goal  Goal: Psychosocial  Outcome: Progressing Towards Goal     Problem: LVAD: Discharge Outcomes  Goal: *Hemodynamically stable  Outcome: Progressing Towards Goal  Goal: *Lungs clear or at baseline  Outcome: Progressing Towards Goal  Goal: *Optimal pain control at patient's stated goal  Outcome: Progressing Towards Goal  Goal: *Demonstrates progressive activity  Outcome: Progressing Towards Goal  Goal: *Tolerating diet  Outcome: Progressing Towards Goal  Goal: *LVAD parameters within set limits  Outcome: Progressing Towards Goal  Goal: *Verbalizes and demonstrates incision care  Outcome: Progressing Towards Goal  Goal: *Demonstrates effective coping  Outcome: Progressing Towards Goal  Goal: *Patient/caregiver is able to perform drive line dressing change independently  Outcome: Progressing Towards Goal  Goal: *LVAD drive line site without signs and symptoms of wound complications  Outcome: Progressing Towards Goal  Goal: *LVAD skills board complete  Outcome: Progressing Towards Goal     Problem: Impaired Skin Integrity/Pressure Injury Treatment  Goal: *Improvement of Existing Pressure Injury  Outcome: Progressing Towards Goal alert/awake

## 2020-01-15 ENCOUNTER — NON-APPOINTMENT (OUTPATIENT)
Age: 70
End: 2020-01-15

## 2020-01-15 ENCOUNTER — APPOINTMENT (OUTPATIENT)
Dept: CARDIOLOGY | Facility: CLINIC | Age: 70
End: 2020-01-15
Payer: MEDICARE

## 2020-01-15 VITALS
WEIGHT: 72 LBS | OXYGEN SATURATION: 94 % | HEIGHT: 58 IN | HEART RATE: 64 BPM | DIASTOLIC BLOOD PRESSURE: 60 MMHG | BODY MASS INDEX: 15.11 KG/M2 | SYSTOLIC BLOOD PRESSURE: 160 MMHG

## 2020-01-15 VITALS — SYSTOLIC BLOOD PRESSURE: 140 MMHG | DIASTOLIC BLOOD PRESSURE: 60 MMHG

## 2020-01-15 PROCEDURE — 93000 ELECTROCARDIOGRAM COMPLETE: CPT

## 2020-01-15 PROCEDURE — 99214 OFFICE O/P EST MOD 30 MIN: CPT

## 2020-01-16 NOTE — PHYSICAL EXAM
[General Appearance - In No Acute Distress] : no acute distress [Normal Conjunctiva] : the conjunctiva exhibited no abnormalities [Normal Oral Mucosa] : normal oral mucosa [Eyelids - No Xanthelasma] : the eyelids demonstrated no xanthelasmas [No Oral Pallor] : no oral pallor [No Oral Cyanosis] : no oral cyanosis [Normal Oropharynx] : normal oropharynx [Respiration, Rhythm And Depth] : normal respiratory rhythm and effort [Exaggerated Use Of Accessory Muscles For Inspiration] : no accessory muscle use [] : no respiratory distress [Heart Rate And Rhythm] : heart rate and rhythm were normal [Auscultation Breath Sounds / Voice Sounds] : lungs were clear to auscultation bilaterally [Heart Sounds] : normal S1 and S2 [Arterial Pulses Normal] : the arterial pulses were normal [Systolic grade ___/6] : A grade [unfilled]/6 systolic murmur was heard. [Abdomen Soft] : soft [Bowel Sounds] : normal bowel sounds [Abdomen Tenderness] : non-tender [Nail Clubbing] : no clubbing of the fingernails [Cyanosis, Localized] : no localized cyanosis [Skin Color & Pigmentation] : normal skin color and pigmentation [No Venous Stasis] : no venous stasis [No Skin Ulcers] : no skin ulcer [No Xanthoma] : no  xanthoma was observed [Affect] : the affect was normal [Oriented To Time, Place, And Person] : oriented to person, place, and time [Impaired Insight] : insight and judgment were intact [Mood] : the mood was normal [FreeTextEntry1] : nonpitting edema, R > L

## 2020-01-16 NOTE — HISTORY OF PRESENT ILLNESS
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 Her orthopedic condition has resulted in kyphoscoliosis and restrictive lung disease. She was recently admitted to [facility] with severe pneumonia and in the setting of the pneumonia, she experienced paroxysms of AFib with RVR and associated chest discomfort. She was started on apixaban for anticoagulation and diltiazem for rate and rhythm control. Neither beta-blockers nor amiodarone would have been ideal choices given her restrictive airway disease. She was discharged home on those medications with new home oxygen and CPAP at night, and she completed three weeks of CardioNet monitor that was put on prior to discharge.\par There was no evidence of atrial fibrillation on the monitor, and she has remained symptom free.\par \par Hospitalized in [place], [place] recently with small bowel obstruction that corrected with NG tube decompression. She never went back into AFib. No surgical intervention was necessary. She has since been seen by her GI (Shar Givens MD) and colorectal surgeon (Hari Gonzalez MD) who agree that nothing further needs to be done at this time.\par \par EKG today shows normal sinus rhythm.\par \par 12/10/2018 - Patient had an upper airway infection over the Thanksgiving Holiday. She was prescribed doxycycline, which expedited her improvement in symptoms. No further cough.\par \par June 2019 - Patient wore a 24 hour blood pressure monitor and was seen to be hypertensive. She was started on lisinopril 10 mg daily in addition to the diltiazem 120 mg daily. She was also started on Prolia.\par She had repeat blood work with her internist. \par \par January 2020 - Patient returns today in her usual state of health. She continues on the antihypertensive regimen and finds her blood pressures are best when checked by Dr. Hu. She has no new complaints. EKG today shows sinus rhythm.\par \par PMD: Anitra Woodward MD [phone]\par Pulmonologist: Sujit Hu MD [phone]\par Hepatologist: Zachary Hoffmann MD [phone]

## 2020-01-16 NOTE — DISCUSSION/SUMMARY
[FreeTextEntry1] : 70 year-old woman who developed PAF in the setting of severe PNA. \par As there is no further evidence of atrial fibrillation, it is most likely that the episode was provoked in the setting of an acute pneumonia. No further anticoagulation at this time.\par \par Continue diltiazem for now for rate and rhythm control - also has benefits as antihypertensive, although patient reports significant white coat hypertension. Unless patient develops symptoms of dizziness, fatigue (other than that of recovering from PNA), or bradycardia, would plan to continue diltiazem indefinitely to reduce likelihood of future PAF. Patient is not ideal candidate for beta-blocker or amiodarone because of restrictive lung disease.\par Continue ACEi as prescribed by her primary care physician. If she develops a dry cough, would change to ARB.\par \par Follow-up with Dr. Hu as scheduled.

## 2020-01-16 NOTE — REASON FOR VISIT
[Follow-Up - From Hospitalization] : follow-up of a recent hospitalization for [Atrial Fibrillation] : atrial fibrillation [Discharge Date: ___] : Discharge Date: [unfilled] [Spouse] : spouse [Admitted for Heart Failure] : patient was not admitted for heart failure [FreeTextEntry2] : PNA

## 2020-03-05 ENCOUNTER — RX RENEWAL (OUTPATIENT)
Age: 70
End: 2020-03-05

## 2020-09-08 ENCOUNTER — APPOINTMENT (OUTPATIENT)
Dept: CARDIOLOGY | Facility: CLINIC | Age: 70
End: 2020-09-08
Payer: MEDICARE

## 2020-09-08 ENCOUNTER — NON-APPOINTMENT (OUTPATIENT)
Age: 70
End: 2020-09-08

## 2020-09-08 VITALS
OXYGEN SATURATION: 95 % | TEMPERATURE: 98.2 F | SYSTOLIC BLOOD PRESSURE: 158 MMHG | BODY MASS INDEX: 15.88 KG/M2 | DIASTOLIC BLOOD PRESSURE: 64 MMHG | WEIGHT: 76 LBS

## 2020-09-08 VITALS — SYSTOLIC BLOOD PRESSURE: 136 MMHG | DIASTOLIC BLOOD PRESSURE: 74 MMHG

## 2020-09-08 PROCEDURE — 99214 OFFICE O/P EST MOD 30 MIN: CPT

## 2020-09-08 PROCEDURE — 93000 ELECTROCARDIOGRAM COMPLETE: CPT

## 2020-09-08 NOTE — REASON FOR VISIT
[Follow-Up - From Hospitalization] : follow-up of a recent hospitalization for [Atrial Fibrillation] : atrial fibrillation [Discharge Date: ___] : Discharge Date: [unfilled] [Spouse] : spouse [Admitted for Heart Failure] : patient was not admitted for heart failure [FreeTextEntry2] : PNA [FreeTextEntry1] : September 2020 - Patient returns today in her usual state of health.\par Recent labs with her internist showed mild anemia of unclear etiology, hemoglobin 10.3 g/dL.\par She and her  have been very careful and healthy during the Covid pandemic.

## 2020-09-08 NOTE — PHYSICAL EXAM
[General Appearance - In No Acute Distress] : no acute distress [Normal Conjunctiva] : the conjunctiva exhibited no abnormalities [Eyelids - No Xanthelasma] : the eyelids demonstrated no xanthelasmas [Normal Oral Mucosa] : normal oral mucosa [No Oral Cyanosis] : no oral cyanosis [No Oral Pallor] : no oral pallor [Normal Oropharynx] : normal oropharynx [Respiration, Rhythm And Depth] : normal respiratory rhythm and effort [Exaggerated Use Of Accessory Muscles For Inspiration] : no accessory muscle use [] : no respiratory distress [Auscultation Breath Sounds / Voice Sounds] : lungs were clear to auscultation bilaterally [Arterial Pulses Normal] : the arterial pulses were normal [Heart Sounds] : normal S1 and S2 [Heart Rate And Rhythm] : heart rate and rhythm were normal [Systolic grade ___/6] : A grade [unfilled]/6 systolic murmur was heard. [Bowel Sounds] : normal bowel sounds [Abdomen Soft] : soft [Abdomen Tenderness] : non-tender [Skin Color & Pigmentation] : normal skin color and pigmentation [Nail Clubbing] : no clubbing of the fingernails [Cyanosis, Localized] : no localized cyanosis [No Venous Stasis] : no venous stasis [No Skin Ulcers] : no skin ulcer [No Xanthoma] : no  xanthoma was observed [Impaired Insight] : insight and judgment were intact [Oriented To Time, Place, And Person] : oriented to person, place, and time [Mood] : the mood was normal [Affect] : the affect was normal [FreeTextEntry1] : diffuse skeletal and joint deformities

## 2020-12-15 PROBLEM — Z12.11 ENCOUNTER FOR SCREENING COLONOSCOPY: Status: RESOLVED | Noted: 2017-10-11 | Resolved: 2020-12-15

## 2021-02-24 ENCOUNTER — RX RENEWAL (OUTPATIENT)
Age: 71
End: 2021-02-24

## 2021-03-09 ENCOUNTER — APPOINTMENT (OUTPATIENT)
Dept: CARDIOLOGY | Facility: CLINIC | Age: 71
End: 2021-03-09
Payer: MEDICARE

## 2021-03-09 ENCOUNTER — NON-APPOINTMENT (OUTPATIENT)
Age: 71
End: 2021-03-09

## 2021-03-09 VITALS
OXYGEN SATURATION: 94 % | WEIGHT: 76 LBS | HEART RATE: 75 BPM | TEMPERATURE: 97.6 F | DIASTOLIC BLOOD PRESSURE: 72 MMHG | BODY MASS INDEX: 15.88 KG/M2 | SYSTOLIC BLOOD PRESSURE: 183 MMHG

## 2021-03-09 VITALS — SYSTOLIC BLOOD PRESSURE: 165 MMHG | DIASTOLIC BLOOD PRESSURE: 68 MMHG

## 2021-03-09 DIAGNOSIS — M41.9 SCOLIOSIS, UNSPECIFIED: ICD-10-CM

## 2021-03-09 PROCEDURE — 93000 ELECTROCARDIOGRAM COMPLETE: CPT

## 2021-03-09 PROCEDURE — 99214 OFFICE O/P EST MOD 30 MIN: CPT

## 2021-03-21 NOTE — PHYSICAL EXAM
[General Appearance - In No Acute Distress] : no acute distress [Normal Conjunctiva] : the conjunctiva exhibited no abnormalities [Eyelids - No Xanthelasma] : the eyelids demonstrated no xanthelasmas [Normal Oral Mucosa] : normal oral mucosa [No Oral Pallor] : no oral pallor [No Oral Cyanosis] : no oral cyanosis [Normal Oropharynx] : normal oropharynx [] : no respiratory distress [Respiration, Rhythm And Depth] : normal respiratory rhythm and effort [Exaggerated Use Of Accessory Muscles For Inspiration] : no accessory muscle use [Auscultation Breath Sounds / Voice Sounds] : lungs were clear to auscultation bilaterally [Heart Rate And Rhythm] : heart rate and rhythm were normal [Heart Sounds] : normal S1 and S2 [Arterial Pulses Normal] : the arterial pulses were normal [Systolic grade ___/6] : A grade [unfilled]/6 systolic murmur was heard. [Bowel Sounds] : normal bowel sounds [Abdomen Soft] : soft [Abdomen Tenderness] : non-tender [Nail Clubbing] : no clubbing of the fingernails [Cyanosis, Localized] : no localized cyanosis [Skin Color & Pigmentation] : normal skin color and pigmentation [No Venous Stasis] : no venous stasis [No Skin Ulcers] : no skin ulcer [No Xanthoma] : no  xanthoma was observed [Oriented To Time, Place, And Person] : oriented to person, place, and time [Impaired Insight] : insight and judgment were intact [Affect] : the affect was normal [Mood] : the mood was normal [FreeTextEntry1] : diffuse skeletal and joint deformities

## 2021-03-21 NOTE — DISCUSSION/SUMMARY
[FreeTextEntry1] : 71 year-old woman who developed PAF in the setting of severe PNA. \par As there is no further evidence of atrial fibrillation, it is most likely that the episode was provoked in the setting of an acute pneumonia. No further anticoagulation at this time.\par \par Continue diltiazem for now for rate and rhythm control - also has benefits as antihypertensive, although patient reports significant white coat hypertension. Unless patient develops symptoms of dizziness, fatigue (other than that of recovering from PNA), or bradycardia, would plan to continue diltiazem indefinitely to reduce likelihood of future PAF. Patient is not ideal candidate for beta-blocker or amiodarone because of restrictive lung disease.\par Continue ACEi as prescribed by her primary care physician. If she develops a dry cough, would change to ARB.\par \par Follow-up with Dr. Hu as scheduled.

## 2021-03-21 NOTE — REASON FOR VISIT
[Follow-Up - From Hospitalization] : follow-up of a recent hospitalization for [Atrial Fibrillation] : atrial fibrillation [Discharge Date: ___] : Discharge Date: [unfilled] [Spouse] : spouse [Admitted for Heart Failure] : patient was not admitted for heart failure [FreeTextEntry2] : PNA [FreeTextEntry1] : March 2021 - Patient returns today for follow-up. She is in her usual state of health.\par She and her  have each received both doses of the Pfizer Covid vaccine (second dose was 2/15/2021).

## 2021-03-21 NOTE — HISTORY OF PRESENT ILLNESS
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 Her orthopedic condition has resulted in kyphoscoliosis and restrictive lung disease. She was recently admitted to [facility] with severe pneumonia and in the setting of the pneumonia, she experienced paroxysms of AFib with RVR and associated chest discomfort. She was started on apixaban for anticoagulation and diltiazem for rate and rhythm control. Neither beta-blockers nor amiodarone would have been ideal choices given her restrictive airway disease. She was discharged home on those medications with new home oxygen and CPAP at night, and she completed three weeks of CardioNet monitor that was put on prior to discharge.\par There was no evidence of atrial fibrillation on the monitor, and she has remained symptom free.\par \par Hospitalized in [place], [place] recently with small bowel obstruction that corrected with NG tube decompression. She never went back into AFib. No surgical intervention was necessary. She has since been seen by her GI (Shar Givens MD) and colorectal surgeon (Hari Gonzalez MD) who agree that nothing further needs to be done at this time.\par \par EKG today shows normal sinus rhythm.\par \par 12/10/2018 - Patient had an upper airway infection over the Thanksgiving Holiday. She was prescribed doxycycline, which expedited her improvement in symptoms. No further cough.\par \par June 2019 - Patient wore a 24 hour blood pressure monitor and was seen to be hypertensive. She was started on lisinopril 10 mg daily in addition to the diltiazem 120 mg daily. She was also started on Prolia.\par She had repeat blood work with her internist. \par \par January 2020 - Patient returns today in her usual state of health. She continues on the antihypertensive regimen and finds her blood pressures are best when checked by Dr. Hu. She has no new complaints. EKG today shows sinus rhythm.\par \par PMD: Anitra Woodward MD [phone]\par Pulmonologist: Sujit Hu MD [phone]\par Hepatologist: Zachary Hoffmann MD [phone]

## 2021-06-03 DIAGNOSIS — H26.9 UNSPECIFIED CATARACT: ICD-10-CM

## 2021-06-09 DIAGNOSIS — Z86.19 PERSONAL HISTORY OF OTHER INFECTIOUS AND PARASITIC DISEASES: ICD-10-CM

## 2021-06-09 RX ORDER — SODIUM PICOSULFATE, MAGNESIUM OXIDE, AND ANHYDROUS CITRIC ACID 10; 3.5; 12 MG/16.2G; G/16.2G; G/16.2G
10-3.5-12 POWDER, METERED ORAL
Qty: 1 | Refills: 0 | Status: DISCONTINUED | COMMUNITY
Start: 2017-10-18 | End: 2021-06-09

## 2021-06-09 RX ORDER — BRIMONIDINE TARTRATE 1.5 MG/ML
0.15 SOLUTION/ DROPS OPHTHALMIC
Refills: 0 | Status: ACTIVE | COMMUNITY

## 2021-06-09 RX ORDER — DENOSUMAB 60 MG/ML
60 INJECTION SUBCUTANEOUS
Refills: 0 | Status: ACTIVE | COMMUNITY

## 2021-06-09 RX ORDER — FLUTICASONE FUROATE 200 UG/1
200 POWDER RESPIRATORY (INHALATION)
Refills: 0 | Status: ACTIVE | COMMUNITY

## 2021-07-22 ENCOUNTER — NON-APPOINTMENT (OUTPATIENT)
Age: 71
End: 2021-07-22

## 2021-07-22 ENCOUNTER — APPOINTMENT (OUTPATIENT)
Dept: CARDIOLOGY | Facility: CLINIC | Age: 71
End: 2021-07-22
Payer: MEDICARE

## 2021-07-22 VITALS
OXYGEN SATURATION: 100 % | HEART RATE: 67 BPM | BODY MASS INDEX: 15.05 KG/M2 | DIASTOLIC BLOOD PRESSURE: 80 MMHG | WEIGHT: 72 LBS | SYSTOLIC BLOOD PRESSURE: 170 MMHG

## 2021-07-22 DIAGNOSIS — R06.02 SHORTNESS OF BREATH: ICD-10-CM

## 2021-07-22 DIAGNOSIS — J44.9 CHRONIC OBSTRUCTIVE PULMONARY DISEASE, UNSPECIFIED: ICD-10-CM

## 2021-07-22 DIAGNOSIS — I10 ESSENTIAL (PRIMARY) HYPERTENSION: ICD-10-CM

## 2021-07-22 PROCEDURE — 99214 OFFICE O/P EST MOD 30 MIN: CPT

## 2021-07-22 PROCEDURE — 93000 ELECTROCARDIOGRAM COMPLETE: CPT

## 2021-07-26 ENCOUNTER — APPOINTMENT (OUTPATIENT)
Dept: SURGERY | Facility: HOSPITAL | Age: 71
End: 2021-07-26

## 2021-07-28 NOTE — REASON FOR VISIT
[Other: ____] : [unfilled] [Spouse] : spouse [FreeTextEntry1] : July 2021 - Patient returns today for follow-up in her usual state of health. \par She is scheduled for a colonoscopy with Dr. Hari Gonzalez.

## 2021-07-28 NOTE — CARDIOLOGY SUMMARY
[de-identified] : 1/10/2017, sinus 81 bpm with short MO interval and voltage criteria for LVH without repolarization abnormality  1/11/2017, atrial fibrillation at 160 bpm, LVH with lateral ST depressions [de-identified] : 1/11/2017, hyperdynamic LV, concentric remodeling, and PASP 37 mmHg consistent with borderline pulmonary hypertension LVEF 75%. \par 12/14/2018, normal LA size, normal LV systolic function, normal RV systolic function, LVEF 70%

## 2021-07-28 NOTE — DISCUSSION/SUMMARY
[FreeTextEntry1] : 71 year-old woman who developed PAF in the setting of severe PNA. \par As there is no further evidence of atrial fibrillation, it is most likely that the episode was provoked in the setting of an acute pneumonia. No further anticoagulation at this time.\par \par Continue diltiazem for now for rate and rhythm control - also has benefits as antihypertensive, although patient reports significant white coat hypertension. Unless patient develops symptoms of dizziness, fatigue (other than that of recovering from PNA), or bradycardia, would plan to continue diltiazem indefinitely to reduce likelihood of future PAF. Patient is not ideal candidate for beta-blocker or amiodarone because of restrictive lung disease.\par Continue ACEi as prescribed by her primary care physician. If she develops a dry cough, would change to ARB.\par \par Follow-up with Dr. Hu as scheduled. \par \par No further cardiovascular testing is necessary prior to patient's proceeding with colonoscopy.

## 2021-07-28 NOTE — HISTORY OF PRESENT ILLNESS
[FreeTextEntry1] : Patient is a 71 year-old woman with a history of arthrogryposis, multiple joint deformities for which she had pediatric surgeries including blood transfusions from which she contracted HCV. Her orthopedic condition has resulted in kyphoscoliosis and restrictive lung disease. She was recently admitted to [facility] with severe pneumonia and in the setting of the pneumonia, she experienced paroxysms of AFib with RVR and associated chest discomfort. She was started on apixaban for anticoagulation and diltiazem for rate and rhythm control. Neither beta-blockers nor amiodarone would have been ideal choices given her restrictive airway disease. She was discharged home on those medications with new home oxygen and CPAP at night, and she completed three weeks of CardioNet monitor that was put on prior to discharge.\par There was no evidence of atrial fibrillation on the monitor, and she has remained symptom free.\par \par Hospitalized in [place], [place] recently with small bowel obstruction that corrected with NG tube decompression. She never went back into AFib. No surgical intervention was necessary. She has since been seen by her GI (Shar Givens MD) and colorectal surgeon (Hari Gonzalez MD) who agree that nothing further needs to be done at this time.\par \par EKG today shows normal sinus rhythm.\par \par 12/10/2018 - Patient had an upper airway infection over the Thanksgiving Holiday. She was prescribed doxycycline, which expedited her improvement in symptoms. No further cough.\par \par June 2019 - Patient wore a 24 hour blood pressure monitor and was seen to be hypertensive. She was started on lisinopril 10 mg daily in addition to the diltiazem 120 mg daily. She was also started on Prolia.\par She had repeat blood work with her internist. \par \par January 2020 - Patient returns today in her usual state of health. She continues on the antihypertensive regimen and finds her blood pressures are best when checked by Dr. Hu. She has no new complaints. EKG today shows sinus rhythm.\par \par March 2021 - Patient returns today for follow-up. She is in her usual state of health.\par She and her  have each received both doses of the Pfizer Covid vaccine (second dose was 2/15/2021). \par \par PMD: Anitra Woodward MD [phone]\par Pulmonologist: Sujit Hu MD [phone]\par Hepatologist: Zachary Hoffmann MD [phone]

## 2021-11-05 DIAGNOSIS — Z12.11 ENCOUNTER FOR SCREENING FOR MALIGNANT NEOPLASM OF COLON: ICD-10-CM

## 2021-11-05 RX ORDER — SODIUM PICOSULFATE, MAGNESIUM OXIDE, AND ANHYDROUS CITRIC ACID 10; 3.5; 12 MG/160ML; G/160ML; G/160ML
10-3.5-12 MG-GM LIQUID ORAL
Qty: 1 | Refills: 0 | Status: ACTIVE | COMMUNITY
Start: 2021-11-05 | End: 1900-01-01

## 2021-11-06 DIAGNOSIS — Z01.818 ENCOUNTER FOR OTHER PREPROCEDURAL EXAMINATION: ICD-10-CM

## 2021-11-12 ENCOUNTER — APPOINTMENT (OUTPATIENT)
Dept: DISASTER EMERGENCY | Facility: CLINIC | Age: 71
End: 2021-11-12

## 2021-11-13 LAB — SARS-COV-2 N GENE NPH QL NAA+PROBE: NOT DETECTED

## 2021-11-15 ENCOUNTER — OUTPATIENT (OUTPATIENT)
Dept: OUTPATIENT SERVICES | Facility: HOSPITAL | Age: 71
LOS: 1 days | End: 2021-11-15
Payer: MEDICARE

## 2021-11-15 ENCOUNTER — APPOINTMENT (OUTPATIENT)
Dept: SURGERY | Facility: HOSPITAL | Age: 71
End: 2021-11-15
Payer: MEDICARE

## 2021-11-15 ENCOUNTER — RESULT REVIEW (OUTPATIENT)
Age: 71
End: 2021-11-15

## 2021-11-15 VITALS
HEIGHT: 58 IN | WEIGHT: 72.97 LBS | HEART RATE: 60 BPM | SYSTOLIC BLOOD PRESSURE: 134 MMHG | RESPIRATION RATE: 20 BRPM | DIASTOLIC BLOOD PRESSURE: 41 MMHG | OXYGEN SATURATION: 100 % | TEMPERATURE: 97 F

## 2021-11-15 VITALS
SYSTOLIC BLOOD PRESSURE: 116 MMHG | OXYGEN SATURATION: 100 % | RESPIRATION RATE: 20 BRPM | DIASTOLIC BLOOD PRESSURE: 45 MMHG | HEART RATE: 57 BPM

## 2021-11-15 DIAGNOSIS — Z98.89 OTHER SPECIFIED POSTPROCEDURAL STATES: Chronic | ICD-10-CM

## 2021-11-15 DIAGNOSIS — Z90.49 ACQUIRED ABSENCE OF OTHER SPECIFIED PARTS OF DIGESTIVE TRACT: Chronic | ICD-10-CM

## 2021-11-15 DIAGNOSIS — Z98.1 ARTHRODESIS STATUS: Chronic | ICD-10-CM

## 2021-11-15 DIAGNOSIS — Z93.0 TRACHEOSTOMY STATUS: Chronic | ICD-10-CM

## 2021-11-15 DIAGNOSIS — Z41.9 ENCOUNTER FOR PROCEDURE FOR PURPOSES OTHER THAN REMEDYING HEALTH STATE, UNSPECIFIED: Chronic | ICD-10-CM

## 2021-11-15 DIAGNOSIS — C20 MALIGNANT NEOPLASM OF RECTUM: ICD-10-CM

## 2021-11-15 PROCEDURE — 88305 TISSUE EXAM BY PATHOLOGIST: CPT

## 2021-11-15 PROCEDURE — 45380 COLONOSCOPY AND BIOPSY: CPT

## 2021-11-15 PROCEDURE — 45380 COLONOSCOPY AND BIOPSY: CPT | Mod: PT

## 2021-11-15 PROCEDURE — 88305 TISSUE EXAM BY PATHOLOGIST: CPT | Mod: 26

## 2021-11-15 RX ORDER — DILTIAZEM HCL 120 MG
1 CAPSULE, EXT RELEASE 24 HR ORAL
Qty: 0 | Refills: 0 | DISCHARGE

## 2021-11-15 RX ORDER — BRIMONIDINE TARTRATE 2 MG/MG
1 SOLUTION/ DROPS OPHTHALMIC
Qty: 0 | Refills: 0 | DISCHARGE

## 2021-11-15 RX ORDER — FLUTICASONE PROPIONATE 220 MCG
1 AEROSOL WITH ADAPTER (GRAM) INHALATION
Qty: 0 | Refills: 0 | DISCHARGE

## 2021-11-15 RX ORDER — ALBUTEROL 90 UG/1
0 AEROSOL, METERED ORAL
Qty: 0 | Refills: 0 | DISCHARGE

## 2021-11-15 RX ORDER — LATANOPROST 0.05 MG/ML
1 SOLUTION/ DROPS OPHTHALMIC; TOPICAL
Qty: 0 | Refills: 0 | DISCHARGE

## 2021-11-15 RX ORDER — DILTIAZEM HCL 120 MG
0 CAPSULE, EXT RELEASE 24 HR ORAL
Qty: 0 | Refills: 0 | DISCHARGE

## 2021-11-15 RX ORDER — SODIUM CHLORIDE 9 MG/ML
500 INJECTION INTRAMUSCULAR; INTRAVENOUS; SUBCUTANEOUS
Refills: 0 | Status: DISCONTINUED | OUTPATIENT
Start: 2021-11-15 | End: 2021-11-29

## 2021-11-15 RX ORDER — LEVOTHYROXINE SODIUM 125 MCG
1 TABLET ORAL
Qty: 0 | Refills: 0 | DISCHARGE

## 2021-11-15 RX ORDER — MOMETASONE FUROATE 220 UG/1
1 INHALANT RESPIRATORY (INHALATION)
Qty: 0 | Refills: 0 | DISCHARGE

## 2021-11-15 NOTE — ASU PATIENT PROFILE, ADULT - NSICDXPASTMEDICALHX_GEN_ALL_CORE_FT
PAST MEDICAL HISTORY:  Adult Hypothyroidism     Arthrogryposis multiplex congenita     Cataract     Congenital Scoliosis     GERD (gastroesophageal reflux disease)     Hepatitis C treated with Harvoni, 2015    Hypercarbia     Osteoporosis     Paroxysmal a-fib 01/2017, in the setting of severe pneumonia, resolved    Pneumonia multiple, last time 01/2017, complicated by A-fib    Rectal cancer 2008, s/p chemo and colon resection    Respiratory failure was on a Vent for 1 month in 2009, 2015    Restrictive lung disease due to kyphoscoliosis on home O2 with V-PAP machine at 2L at night, intermittent during day time    Small bowel obstruction due to adhesions 08/28/17, treated conservatively

## 2021-11-15 NOTE — ASU PATIENT PROFILE, ADULT - NSICDXPASTSURGICALHX_GEN_ALL_CORE_FT
PAST SURGICAL HISTORY:  H/O eye surgery multiple for cataract and vitrectomy    H/O tracheostomy in for about 1 month s/p respiratory failure, 2009, 2015    History of colon resection - low anterior with loop ileostomy, 2008, reversal in 2009    History of spinal fusion for scoliosis, no rods, fused with autologous bone, 1959    Surgery, other elective multiple surgeries orthopedic as a child, due to scoliosis and club feet, and tendon surgeries all as a child

## 2021-11-15 NOTE — PRE PROCEDURE NOTE - PRE PROCEDURE EVALUATION
Attending Physician:                 dalia           Procedure:  colonoscopy    Indication for Procedure:   history rectal cancer  ______________________________________  __________________  PAST MEDICAL & SURGICAL HISTORY:  Adult Hypothyroidism    Congenital Scoliosis    Osteoporosis    Respiratory failure  was on a Vent for 1 month in 2009, 2015    Cataract    Arthrogryposis multiplex congenita    Pneumonia  multiple, last time 01/2017, complicated by A-fib    Hepatitis C  treated with Jessica, 2015    Rectal cancer  2008, s/p chemo and colon resection    Small bowel obstruction due to adhesions  08/28/17, treated conservatively    Restrictive lung disease due to kyphoscoliosis  on home O2 with V-PAP machine at 2L at night, intermittent during day time    Hypercarbia    Paroxysmal a-fib  01/2017, in the setting of severe pneumonia, resolved    GERD (gastroesophageal reflux disease)    H/O eye surgery  multiple for cataract and vitrectomy    Surgery, other elective  multiple surgeries orthopedic as a child, due to scoliosis and club feet, and tendon surgeries all as a child    History of spinal fusion  for scoliosis, no rods, fused with autologous bone, 1959    H/O tracheostomy  in for about 1 month s/p respiratory failure, 2009, 2015    History of colon resection  - low anterior with loop ileostomy, 2008, reversal in 2009      ALLERGIES:  azithromycin (Other)  contrast media (iodine-based) (Anaphylaxis)  Honey (Rash)  Levaquin (Other)  penicillin (Unknown)  Tylenol (Hives)    HOME MEDICATIONS:  albuterol 2.5 mg/3 mL (0.083%) inhalation solution:   Alphagan P 0.1% ophthalmic solution: 1 drop(s) to each affected eye 2 times a day  Asmanex Twisthaler 120 Dose 220 mcg/inh inhalation aerosol powder: 1 puff(s) inhaled once a day (in the morning)  Cartia  mg/24 hours oral capsule, extended release: 1 cap(s) orally once a day  dilTIAZem 120 mg oral tablet: 1 tab(s) orally every 24 hours  latanoprost 0.005% ophthalmic solution: 1 drop(s) to each affected eye once a day (in the evening)  levothyroxine 50 mcg (0.05 mg) oral tablet: 1 tab(s) orally once a day  multivitamin: 1 tab(s) orally once a day  ProAir HFA 90 mcg/inh inhalation aerosol:   Refresh ophthalmic solution: 1 drop(s) to each affected eye prn  Restasis 0.05% ophthalmic emulsion: 1 drop(s) to each affected eye 2 times a day  Vitamin D3 1000 intl units oral tablet: 1 tab(s) orally once a day  Xalatan 0.005% ophthalmic solution: 1 drop(s) to each affected eye once a day (at bedtime)    AICD/PPM: [ ] yes   [x] no    PERTINENT LAB DATA:                      PHYSICAL EXAMINATION:    T(C): --  HR: --  BP: --  RR: --  SpO2: --    Constitutional: NAD  HEENT: PERRLA, EOMI,    Neck:  No JVD  Respiratory: CTAB/L, severe scoliosis  Cardiovascular: S1 and S2  Gastrointestinal: BS+, soft, NT/ND  Extremities: No peripheral edema  Neurological: A/O x 3, no focal deficits  Psychiatric: Normal mood, normal affect  Skin: No rashes    ASA Class: I [ ]  II [ ]  III [ ]  IV [ ]  per anesthesia    COMMENTS:    The patient is a suitable candidate for the planned procedure unless box checked [ ]  No, explain:

## 2021-11-15 NOTE — ASU DISCHARGE PLAN (ADULT/PEDIATRIC) - CALL YOUR DOCTOR IF YOU HAVE ANY OF THE FOLLOWING:
Bleeding that does not stop/Nausea and vomiting that does not stop/Excessive diarrhea/Inability to tolerate liquids or foods/Increased irritability or sluggishness

## 2021-11-17 LAB — SURGICAL PATHOLOGY STUDY: SIGNIFICANT CHANGE UP

## 2022-01-04 NOTE — H&P PST ADULT - GIT GEN HX ROS MEA POS PC
CT RN Call 1 (Enrollment Call)     Situation: Patient triggered for high risk readmission. Risk for Unplanned Readmission Score at Discharge is: 33%  Patient is enrolled in High Risk Transitions in Medicare Bundles 90 Day Care program.     Background: Discharge to home 12/31/2021 Resp Inf     Discharge Diagnoses:   1. COVID-19 virus infection    2. Hemodialysis patient (CMS/[facility])    3. Pneumonia due to COVID-19 virus    1. Acute hypoxic respiratory failure-resolved  2. Covid-19  pneumonia  3. COPD?  4. Morbid obesity   5. Hypertension, uncontrolled    6. Obstructive sleep apnea with CPAP non-compliance   7. ESRD, HD dependent TTS  8. DM II- A1c 6.9%  9. H/o breast cancer  10. Anemia in chronic disease  11. + coag-negative staph blood cultures-contaminant        Hospital Course:   Patient was admitted the hospital with respiratory distress and fever.  She was diagnosed with COVID-19 pneumonia.  She was given monoclonal antibody continued to have symptoms.  Was admitted the hospital treated with Decadron and remdesivir.  Improved currently requiring 2 L oxygen with activity.  Has been on oxygen in the past.  Patient was aggressively diuresed and significant amount of fluid was removed today was 7.2 L.  She was cleared by pulmonology for discharge with outpatient follow-up she had some desaturation at night time and was thought to have sleep apnea.  CPAP was recommended which patient has been noncompliant for over for 5 years.  Patient was also seen by Nephrology and was dialyzed.  There was some problem with her right arm AV fistula which was ballooned earlier today with IR.  Initially she wanted to stay overnight but decided to leave she was cleared by Nephrology and pulmonology for discharge with outpatient follow-up. Home O2 ordered on DC     Lm on cell vm to call me to complete our enrollment/covid call. Pt is enrolled in the Medicare bundles 90 day program         
intermittent abdominal discomfort

## 2022-02-15 ENCOUNTER — RX RENEWAL (OUTPATIENT)
Age: 72
End: 2022-02-15

## 2022-04-11 ENCOUNTER — NON-APPOINTMENT (OUTPATIENT)
Age: 72
End: 2022-04-11

## 2022-04-11 ENCOUNTER — APPOINTMENT (OUTPATIENT)
Dept: CARDIOLOGY | Facility: CLINIC | Age: 72
End: 2022-04-11
Payer: MEDICARE

## 2022-04-11 VITALS
HEART RATE: 79 BPM | DIASTOLIC BLOOD PRESSURE: 60 MMHG | WEIGHT: 68 LBS | BODY MASS INDEX: 14.21 KG/M2 | OXYGEN SATURATION: 95 % | SYSTOLIC BLOOD PRESSURE: 150 MMHG

## 2022-04-11 VITALS — SYSTOLIC BLOOD PRESSURE: 146 MMHG | DIASTOLIC BLOOD PRESSURE: 69 MMHG

## 2022-04-11 PROCEDURE — 93000 ELECTROCARDIOGRAM COMPLETE: CPT

## 2022-04-11 PROCEDURE — 99214 OFFICE O/P EST MOD 30 MIN: CPT

## 2022-04-11 RX ORDER — DILTIAZEM HYDROCHLORIDE 120 MG/1
120 CAPSULE, EXTENDED RELEASE ORAL
Qty: 90 | Refills: 3 | Status: DISCONTINUED | COMMUNITY
Start: 2018-03-08 | End: 2022-04-11

## 2022-04-11 NOTE — DISCUSSION/SUMMARY
[FreeTextEntry1] : 72 year-old woman who developed PAF in the setting of severe PNA. \par As there is no further evidence of atrial fibrillation, it is most likely that the episode was provoked in the setting of an acute pneumonia. No further anticoagulation at this time.\par \par Continue diltiazem for now for rate and rhythm control - also has benefits as antihypertensive, although patient reports significant white coat hypertension. Unless patient develops symptoms of dizziness, fatigue (other than that of recovering from PNA), or bradycardia, would plan to continue diltiazem indefinitely to reduce likelihood of future PAF. Patient is not ideal candidate for beta-blocker or amiodarone because of restrictive lung disease.\par Continue ACEi as prescribed by her primary care physician. If she develops a dry cough, would change to ARB.\par \par Follow-up with Dr. Hu as scheduled. \par \par No further cardiovascular testing is necessary prior to patient's proceeding with colonoscopy.

## 2022-04-11 NOTE — REASON FOR VISIT
[Other: ____] : [unfilled] [Spouse] : spouse [FreeTextEntry1] : April 2022 - Patient returns today for follow-up in her usual state of health. She tolerated her colonoscopy well in late 2021.\par Dr. Hu has recommended she go for the second Covid-19 booster.

## 2022-04-11 NOTE — CARDIOLOGY SUMMARY
[de-identified] : 1/10/2017, sinus 81 bpm with short MO interval and voltage criteria for LVH without repolarization abnormality  1/11/2017, atrial fibrillation at 160 bpm, LVH with lateral ST depressions [de-identified] : 1/11/2017, hyperdynamic LV, concentric remodeling, and PASP 37 mmHg consistent with borderline pulmonary hypertension LVEF 75%. \par 12/14/2018, normal LA size, normal LV systolic function, normal RV systolic function, LVEF 70%

## 2022-04-11 NOTE — HISTORY OF PRESENT ILLNESS
[FreeTextEntry1] : Patient is a 72 year-old woman with a history of arthrogryposis, multiple joint deformities for which she had pediatric surgeries including blood transfusions from which she contracted HCV. Her orthopedic condition has resulted in kyphoscoliosis and restrictive lung disease. She was recently admitted to [facility] with severe pneumonia and in the setting of the pneumonia, she experienced paroxysms of AFib with RVR and associated chest discomfort. She was started on apixaban for anticoagulation and diltiazem for rate and rhythm control. Neither beta-blockers nor amiodarone would have been ideal choices given her restrictive airway disease. She was discharged home on those medications with new home oxygen and CPAP at night, and she completed three weeks of CardioNet monitor that was put on prior to discharge.\par There was no evidence of atrial fibrillation on the monitor, and she has remained symptom free.\par \par Hospitalized in [place], [place] recently with small bowel obstruction that corrected with NG tube decompression. She never went back into AFib. No surgical intervention was necessary. She has since been seen by her GI (Shar Givens MD) and colorectal surgeon (Hari Gonzalez MD) who agree that nothing further needs to be done at this time.\par \par EKG today shows normal sinus rhythm.\par \par 12/10/2018 - Patient had an upper airway infection over the Thanksgiving Holiday. She was prescribed doxycycline, which expedited her improvement in symptoms. No further cough.\par \par June 2019 - Patient wore a 24 hour blood pressure monitor and was seen to be hypertensive. She was started on lisinopril 10 mg daily in addition to the diltiazem 120 mg daily. She was also started on Prolia.\par She had repeat blood work with her internist. \par \par January 2020 - Patient returns today in her usual state of health. She continues on the antihypertensive regimen and finds her blood pressures are best when checked by Dr. Hu. She has no new complaints. EKG today shows sinus rhythm.\par \par March 2021 - Patient returns today for follow-up. She is in her usual state of health.\par She and her  have each received both doses of the Pfizer Covid vaccine (second dose was 2/15/2021). \par \par July 2021 - Patient returns today for follow-up in her usual state of health. \par She is scheduled for a colonoscopy with Dr. Hari Gonzalez.\par \par PMD: Anitra Woodward MD [phone]\par Pulmonologist: Sujit Hu MD [phone]\par Hepatologist: Zachary Hoffmann MD [phone]

## 2022-06-28 ENCOUNTER — APPOINTMENT (OUTPATIENT)
Dept: GASTROENTEROLOGY | Facility: CLINIC | Age: 72
End: 2022-06-28

## 2022-06-28 ENCOUNTER — NON-APPOINTMENT (OUTPATIENT)
Age: 72
End: 2022-06-28

## 2022-06-28 VITALS
WEIGHT: 68 LBS | SYSTOLIC BLOOD PRESSURE: 170 MMHG | HEIGHT: 58 IN | DIASTOLIC BLOOD PRESSURE: 80 MMHG | OXYGEN SATURATION: 98 % | TEMPERATURE: 98.8 F | BODY MASS INDEX: 14.27 KG/M2 | HEART RATE: 88 BPM

## 2022-06-28 DIAGNOSIS — R11.2 NAUSEA WITH VOMITING, UNSPECIFIED: ICD-10-CM

## 2022-06-28 DIAGNOSIS — K56.609 UNSPECIFIED INTESTINAL OBSTRUCTION, UNSPECIFIED AS TO PARTIAL VERSUS COMPLETE OBSTRUCTION: ICD-10-CM

## 2022-06-28 DIAGNOSIS — R14.0 ABDOMINAL DISTENSION (GASEOUS): ICD-10-CM

## 2022-06-28 DIAGNOSIS — R11.0 NAUSEA: ICD-10-CM

## 2022-06-28 PROCEDURE — 99215 OFFICE O/P EST HI 40 MIN: CPT

## 2022-06-28 RX ORDER — PEPPERMINT OIL 90 MG
90 CAPSULE, DELAYED, AND EXTENDED RELEASE ORAL
Qty: 60 | Refills: 1 | Status: ACTIVE | COMMUNITY
Start: 2022-06-28 | End: 1900-01-01

## 2022-06-28 RX ORDER — ONDANSETRON 4 MG/1
4 TABLET, ORALLY DISINTEGRATING ORAL
Qty: 40 | Refills: 0 | Status: ACTIVE | COMMUNITY
Start: 2022-06-28 | End: 1900-01-01

## 2022-07-16 PROBLEM — K56.609 SMALL BOWEL OBSTRUCTION: Status: ACTIVE | Noted: 2017-09-21

## 2022-07-16 NOTE — ASSESSMENT
[FreeTextEntry1] : 73 yo F pmh colon cancer s/p colectomy and primary anastomosis, h/o HCV s/p treatment with SVR, and h/o SBO treated conservatively presents for follow up. Just as her prior evaluation, she appears to be getting intermittent pSBO vs. SB ileus about 6x/year.  This is self managed over 24 hours and resolves.  Within a day or two, she is back to herself with no restrictions.  We reviewed that this is the working theory and that it is a challenge to assess a symptom when it is so sporadic and she does not come for visit at time of symptom -> this is understandable given the intermittent, sporadic nature with relative quick self resolution.  We can try rapid onset anti-emetic to hopefully mitigate her symptoms as they develop.\par \par Impression:\par 1) Recurrent, intermittent nausea/vomiting - likely 2/2 pSBO or SB Ileus - continues pretty much every other month\par 2) H/o high grade SBO\par 3) H/o Colon cancer - surveillance UTD and as per [facility]EE\par 4) Minimal GERD - controlled\par 5) HCV s/p SVR\par 6) Bloating/Flatus - still disruptive\par \par Plan:\par 1) Low FODMAP diet\par 2) Trial of IB Kurt\par 3) Okay with SL Zofran when Nausea occurs with goal of aborting symptoms rapidly\par 4) Supportive care if partial obstructive symptom arise, but we again reviewed warning signs of complete instruction and why she would need to go to the ED.\par 5) Colon Cancer Screening as per Hari Gonzalez. \par 6) All discussed at length with many questions answered for both patient and . All questions answered. Plan agreed upon after extended visit to appropriately address patients concerns\par 7) RPV PRN

## 2022-07-16 NOTE — HISTORY OF PRESENT ILLNESS
[FreeTextEntry1] : Follow up: 7/2019\par Plan after last visit:\par Small bowel obstruction (560.9) (K56.609)\par Rectal cancer (154.1) (C20)\par \par 68 yo F pmh colon cancer s/p colectomy and primary anastomosis, h/o HCV s/p treatment with SVR, and h/o SBO treated conservatively presents for follow up. She appears to be getting intermittent pSBO vs. SB ileus. She self manages with conservative care and is doing well overall. There is no intervenable target on imaging. As such the mainstay of therapy is supportive, which is amenable to. She is due for CRC Screening next year, but otherwise is doing well.\par \par Impression:\par 1) Recurrent, intermittent nausea/vomiting - likely 2/2 pSBO or SB Ileus\par 2) H/o high grade SBO\par 3) H/o Colon cancer - due 2020\par 4) Minimal GERD\par 5) HCV s/p SVR\par 6) Bloating\par \par Plan:\par 1) Low FODMAP diet\par 2) Continue with probiotic\par 3) Okay with PRN peppermint oil vs. Gas X\par 4) Supportive care if partial obstructive symptom arise, but we reviewed warning signs of complete instruction and why she would need to go to the ED.\par 5) Colon 2020\par 6) CC: Hari Gonzalez. \par 7) All discussed at length. All questions answered. Plan agreed upon\par 8) RV pending above. \par \par -------------------------------------------------------------------------------------------\par \par This is patient's first visit to office in 3 years.  She returns to see if there is anything new to treat.\par \par Since last visit\par Still with similar episodes of vomiting every 8 weeks\par In between she feels largely fine with no symptoms\par Still with significant gas symptoms and flatus - unclear if this is truly uncomfortable or just disliked\par Tolerated food well.  Denies dysphagia, odynophagia, n/v\par Stools regular\par Gets colon cancer screening with her colorectal surgeon\par \par She has no other complaints, but rather wants to discuss what else can be done.\par \par \par ---------------------------------------------------\par \par CT Scan 2018:\par IMPRESSION: \par \par Multiple dilated fluid-filled loops of small bowel without discrete \par transition point are suggestive of ileus. \par \par \par \par \par Colon 11/2021 - [facility]\par 4 mm polyp in ascending colon\par \par Path:\par Specimen(s) Submitted\par 1  Ascending colon polyp\par \par Final Diagnosis\par Colon, ascending, polyp, biopsy:\par - Fragments of tubular adenoma.\par \par Verified by: Casi Ford\par \par \par Last cardiology 4/2022\par Discussion/Summary\par 72 year-old woman who developed PAF in the setting of severe PNA. \par As there is no further evidence of atrial fibrillation, it is most likely that the episode was provoked in the setting of an acute pneumonia. No further anticoagulation at this time.\par \par Continue diltiazem for now for rate and rhythm control - also has benefits as antihypertensive, although patient reports significant white coat hypertension. Unless patient develops symptoms of dizziness, fatigue (other than that of recovering from PNA), or bradycardia, would plan to continue diltiazem indefinitely to reduce likelihood of future PAF. Patient is not ideal candidate for beta-blocker or amiodarone because of restrictive lung disease.\par Continue ACEi as prescribed by her primary care physician. If she develops a dry cough, would change to ARB.\par \par Follow-up with Dr. Hu as scheduled. \par \par No further cardiovascular testing is necessary prior to patient's proceeding with colonoscopy.

## 2022-07-16 NOTE — PHYSICAL EXAM
[General Appearance - Alert] : alert [General Appearance - In No Acute Distress] : in no acute distress [General Appearance - Well Nourished] : well nourished [Sclera] : the sclera and conjunctiva were normal [Extraocular Movements] : extraocular movements were intact [Strabismus] : no strabismus was seen [Outer Ear] : the ears and nose were normal in appearance [Examination Of The Oral Cavity] : the lips and gums were normal [Oropharynx] : the oropharynx was normal [Neck Appearance] : the appearance of the neck was normal [Thyroid Diffuse Enlargement] : the thyroid was not enlarged [Thyroid Nodule] : there were no palpable thyroid nodules [Respiration, Rhythm And Depth] : normal respiratory rhythm and effort [Exaggerated Use Of Accessory Muscles For Inspiration] : no accessory muscle use [Auscultation Breath Sounds / Voice Sounds] : lungs were clear to auscultation bilaterally [Heart Rate And Rhythm] : heart rate was normal and rhythm regular [Heart Sounds] : normal S1 and S2 [Murmurs] : no murmurs [Edema] : there was no peripheral edema [Bowel Sounds] : normal bowel sounds [Abdomen Soft] : soft [Abdomen Tenderness] : non-tender [Abdomen Mass (___ Cm)] : no abdominal mass palpated [Abdomen Hernia] : no hernia was discovered [No CVA Tenderness] : no ~M costovertebral angle tenderness [Involuntary Movements] : no involuntary movements were seen [] : no rash [Skin Lesions] : no skin lesions [FreeTextEntry1] : aox3 [Impaired Insight] : insight and judgment were intact [Affect] : the affect was normal

## 2022-10-10 NOTE — DISCHARGE NOTE ADULT - CONDITION (STATED IN TERMS THAT PERMIT A SPECIFIC MEASURABLE COMPARISON WITH CONDITION ON ADMISSION):
Medically Cleared for discharge with [facility] home bipap and Holter Monitor. Full (7 day course) IV Levaquin completed
(0) indicator not present

## 2022-12-09 ENCOUNTER — APPOINTMENT (OUTPATIENT)
Dept: CARDIOLOGY | Facility: CLINIC | Age: 72
End: 2022-12-09

## 2022-12-09 ENCOUNTER — NON-APPOINTMENT (OUTPATIENT)
Age: 72
End: 2022-12-09

## 2022-12-09 VITALS
OXYGEN SATURATION: 97 % | DIASTOLIC BLOOD PRESSURE: 60 MMHG | HEART RATE: 61 BPM | SYSTOLIC BLOOD PRESSURE: 162 MMHG | WEIGHT: 68 LBS | BODY MASS INDEX: 14.21 KG/M2

## 2022-12-09 PROCEDURE — 99214 OFFICE O/P EST MOD 30 MIN: CPT

## 2022-12-09 PROCEDURE — 93000 ELECTROCARDIOGRAM COMPLETE: CPT

## 2022-12-13 NOTE — CARDIOLOGY SUMMARY
[de-identified] : 1/10/2017, sinus 81 bpm with short NE interval and voltage criteria for LVH without repolarization abnormality  1/11/2017, atrial fibrillation at 160 bpm, LVH with lateral ST depressions [de-identified] : 1/11/2017, hyperdynamic LV, concentric remodeling, and PASP 37 mmHg consistent with borderline pulmonary hypertension LVEF 75%. \par 12/14/2018, normal LA size, normal LV systolic function, normal RV systolic function, LVEF 70%

## 2022-12-13 NOTE — DISCUSSION/SUMMARY
[EKG obtained to assist in diagnosis and management of assessed problem(s)] : EKG obtained to assist in diagnosis and management of assessed problem(s) [FreeTextEntry1] : 72 year-old woman who developed PAF in the setting of severe PNA. \par As there is no further evidence of atrial fibrillation, it is most likely that the episode was provoked in the setting of an acute pneumonia. No further anticoagulation at this time.\par \par Continue diltiazem for now for rate and rhythm control - also has benefits as antihypertensive, although patient reports significant white coat hypertension. Unless patient develops symptoms of dizziness, fatigue (other than that of recovering from PNA), or bradycardia, would plan to continue diltiazem indefinitely to reduce likelihood of future PAF. Patient is not ideal candidate for beta-blocker or amiodarone because of restrictive lung disease.\par Continue ACEi as prescribed by her primary care physician. If she develops a dry cough, would change to ARB.\par \par Follow-up with Dr. Hu as scheduled. \par \par Continue diltiazem 240 mg daily to address elevated blood pressure.

## 2022-12-13 NOTE — HISTORY OF PRESENT ILLNESS
[FreeTextEntry1] : Patient is a 72 year-old woman with a history of arthrogryposis, multiple joint deformities for which she had pediatric surgeries including blood transfusions from which she contracted HCV. Her orthopedic condition has resulted in kyphoscoliosis and restrictive lung disease. She was recently admitted to [facility] with severe pneumonia and in the setting of the pneumonia, she experienced paroxysms of AFib with RVR and associated chest discomfort. She was started on apixaban for anticoagulation and diltiazem for rate and rhythm control. Neither beta-blockers nor amiodarone would have been ideal choices given her restrictive airway disease. She was discharged home on those medications with new home oxygen and CPAP at night, and she completed three weeks of CardioNet monitor that was put on prior to discharge.\par There was no evidence of atrial fibrillation on the monitor, and she has remained symptom free.\par \par Hospitalized in [place], [place] recently with small bowel obstruction that corrected with NG tube decompression. She never went back into AFib. No surgical intervention was necessary. She has since been seen by her GI (Shar Givens MD) and colorectal surgeon (Hari Gonzalez MD) who agree that nothing further needs to be done at this time.\par \par EKG today shows normal sinus rhythm.\par \par 12/10/2018 - Patient had an upper airway infection over the Thanksgiving Holiday. She was prescribed doxycycline, which expedited her improvement in symptoms. No further cough.\par \par June 2019 - Patient wore a 24 hour blood pressure monitor and was seen to be hypertensive. She was started on lisinopril 10 mg daily in addition to the diltiazem 120 mg daily. She was also started on Prolia.\par She had repeat blood work with her internist. \par \par January 2020 - Patient returns today in her usual state of health. She continues on the antihypertensive regimen and finds her blood pressures are best when checked by Dr. Hu. She has no new complaints. EKG today shows sinus rhythm.\par \par March 2021 - Patient returns today for follow-up. She is in her usual state of health.\par She and her  have each received both doses of the Pfizer Covid vaccine (second dose was 2/15/2021). \par \par July 2021 - Patient returns today for follow-up in her usual state of health. \par She is scheduled for a colonoscopy with Dr. Hari Gonzalez.\par \par PMD: Anitra Woodward MD [phone]\par Pulmonologist: Sujit Hu MD [phone]\par Hepatologist: Zachary Hoffmann MD [phone]

## 2023-01-10 ENCOUNTER — APPOINTMENT (OUTPATIENT)
Dept: GASTROENTEROLOGY | Facility: CLINIC | Age: 73
End: 2023-01-10

## 2023-02-27 NOTE — ASU PATIENT PROFILE, ADULT - ABILITY TO HEAR (WITH HEARING AID OR HEARING APPLIANCE IF NORMALLY USED):
Adequate: hears normal conversation without difficulty Rifampin Counseling: I discussed with the patient the risks of rifampin including but not limited to liver damage, kidney damage, red-orange body fluids, nausea/vomiting and severe allergy.

## 2023-04-12 ENCOUNTER — RX RENEWAL (OUTPATIENT)
Age: 73
End: 2023-04-12

## 2023-06-26 ENCOUNTER — APPOINTMENT (OUTPATIENT)
Dept: CARDIOLOGY | Facility: CLINIC | Age: 73
End: 2023-06-26
Payer: MEDICARE

## 2023-06-26 ENCOUNTER — NON-APPOINTMENT (OUTPATIENT)
Age: 73
End: 2023-06-26

## 2023-06-26 VITALS
HEART RATE: 73 BPM | OXYGEN SATURATION: 96 % | WEIGHT: 70 LBS | BODY MASS INDEX: 14.69 KG/M2 | HEIGHT: 58 IN | SYSTOLIC BLOOD PRESSURE: 158 MMHG | DIASTOLIC BLOOD PRESSURE: 67 MMHG | RESPIRATION RATE: 15 BRPM

## 2023-06-26 DIAGNOSIS — R94.31 ABNORMAL ELECTROCARDIOGRAM [ECG] [EKG]: ICD-10-CM

## 2023-06-26 PROCEDURE — 93000 ELECTROCARDIOGRAM COMPLETE: CPT

## 2023-06-26 PROCEDURE — 99214 OFFICE O/P EST MOD 30 MIN: CPT

## 2023-06-26 RX ORDER — LISINOPRIL 10 MG/1
10 TABLET ORAL DAILY
Qty: 90 | Refills: 1 | Status: DISCONTINUED | COMMUNITY
Start: 2020-01-15 | End: 2023-06-26

## 2023-06-27 ENCOUNTER — APPOINTMENT (OUTPATIENT)
Dept: CARDIOLOGY | Facility: CLINIC | Age: 73
End: 2023-06-27
Payer: MEDICARE

## 2023-06-27 PROCEDURE — 93306 TTE W/DOPPLER COMPLETE: CPT

## 2023-06-27 NOTE — DISCUSSION/SUMMARY
[EKG obtained to assist in diagnosis and management of assessed problem(s)] : EKG obtained to assist in diagnosis and management of assessed problem(s) [FreeTextEntry1] : 73 year-old woman who developed PAF in the setting of severe PNA. \par As there is no further evidence of atrial fibrillation, it is most likely that the episode was provoked in the setting of an acute pneumonia. No further anticoagulation at this time.\par \par Continue diltiazem for now for rate and rhythm control - also has benefits as antihypertensive, although patient reports significant white coat hypertension. Unless patient develops symptoms of dizziness, fatigue (other than that of recovering from PNA), or bradycardia, would plan to continue diltiazem indefinitely to reduce likelihood of future PAF. Patient is not ideal candidate for beta-blocker or amiodarone because of restrictive lung disease.\par Reduce diltiazem from 240 mg daily to 120 mg daily due to swelling in the legs. \par Will change from lisinopril 10 mg daily to valsartan 80 mg daily. Will uptitrate ARB as needed for blood pressure. \par \par Follow-up with Dr. Hu as scheduled. \par \par For patient with new LBBB, will check echocardiogram.

## 2023-06-27 NOTE — CARDIOLOGY SUMMARY
[de-identified] : 1/10/2017, sinus 81 bpm with short IN interval and voltage criteria for LVH without repolarization abnormality  1/11/2017, atrial fibrillation at 160 bpm, LVH with lateral ST depressions [de-identified] : 1/11/2017, hyperdynamic LV, concentric remodeling, and PASP 37 mmHg consistent with borderline pulmonary hypertension LVEF 75%. \par 12/14/2018, normal LA size, normal LV systolic function, normal RV systolic function, LVEF 70%

## 2023-06-27 NOTE — REASON FOR VISIT
[Other: ____] : [unfilled] [Spouse] : spouse [FreeTextEntry1] : June 2023 - Patient returns today for follow-up.\par She has been noticing more swelling in her legs since increasing her diltiazem to 240 mg daily.\par She continues to take lisinopril 10 mg QHS.\par

## 2023-06-27 NOTE — HISTORY OF PRESENT ILLNESS
[FreeTextEntry1] : Patient is a 73 year-old woman with a history of arthrogryposis, multiple joint deformities for which she had pediatric surgeries including blood transfusions from which she contracted HCV. Her orthopedic condition has resulted in kyphoscoliosis and restrictive lung disease. She was recently admitted to [facility] with severe pneumonia and in the setting of the pneumonia, she experienced paroxysms of AFib with RVR and associated chest discomfort. She was started on apixaban for anticoagulation and diltiazem for rate and rhythm control. Neither beta-blockers nor amiodarone would have been ideal choices given her restrictive airway disease. She was discharged home on those medications with new home oxygen and CPAP at night, and she completed three weeks of CardioNet monitor that was put on prior to discharge.\par There was no evidence of atrial fibrillation on the monitor, and she has remained symptom free.\par \par Hospitalized in [place], [place] recently with small bowel obstruction that corrected with NG tube decompression. She never went back into AFib. No surgical intervention was necessary. She has since been seen by her GI (Shar Givens MD) and colorectal surgeon (Hari Gonzalez MD) who agree that nothing further needs to be done at this time.\par \par EKG today shows normal sinus rhythm.\par \par 12/10/2018 - Patient had an upper airway infection over the Thanksgiving Holiday. She was prescribed doxycycline, which expedited her improvement in symptoms. No further cough.\par \par June 2019 - Patient wore a 24 hour blood pressure monitor and was seen to be hypertensive. She was started on lisinopril 10 mg daily in addition to the diltiazem 120 mg daily. She was also started on Prolia.\par She had repeat blood work with her internist. \par \par January 2020 - Patient returns today in her usual state of health. She continues on the antihypertensive regimen and finds her blood pressures are best when checked by Dr. Hu. She has no new complaints. EKG today shows sinus rhythm.\par \par March 2021 - Patient returns today for follow-up. She is in her usual state of health.\par She and her  have each received both doses of the Pfizer Covid vaccine (second dose was 2/15/2021). \par \par July 2021 - Patient returns today for follow-up in her usual state of health. \par She is scheduled for a colonoscopy with Dr. Hari Gonzalez.\par \par April 2022 - Patient returns today for follow-up in her usual state of health. She tolerated her colonoscopy well in late 2021.\par Dr. Hu has recommended she go for the second Covid-19 booster.\par \par PMD: Anitra Woodward MD [phone]\par Pulmonologist: Sujit Hu MD [phone]\par Hepatologist: Zachary Hoffmann MD [phone]

## 2023-08-26 ENCOUNTER — RX RENEWAL (OUTPATIENT)
Age: 73
End: 2023-08-26

## 2024-02-07 ENCOUNTER — RX RENEWAL (OUTPATIENT)
Age: 74
End: 2024-02-07

## 2024-02-28 ENCOUNTER — APPOINTMENT (OUTPATIENT)
Dept: CARDIOLOGY | Facility: CLINIC | Age: 74
End: 2024-02-28
Payer: MEDICARE

## 2024-02-28 ENCOUNTER — NON-APPOINTMENT (OUTPATIENT)
Age: 74
End: 2024-02-28

## 2024-02-28 VITALS
OXYGEN SATURATION: 100 % | BODY MASS INDEX: 14.06 KG/M2 | WEIGHT: 67 LBS | HEIGHT: 58 IN | HEART RATE: 62 BPM | DIASTOLIC BLOOD PRESSURE: 76 MMHG | SYSTOLIC BLOOD PRESSURE: 164 MMHG

## 2024-02-28 DIAGNOSIS — H91.93 UNSPECIFIED HEARING LOSS, BILATERAL: ICD-10-CM

## 2024-02-28 DIAGNOSIS — R06.89 OTHER ABNORMALITIES OF BREATHING: ICD-10-CM

## 2024-02-28 DIAGNOSIS — I44.7 LEFT BUNDLE-BRANCH BLOCK, UNSPECIFIED: ICD-10-CM

## 2024-02-28 DIAGNOSIS — I48.0 PAROXYSMAL ATRIAL FIBRILLATION: ICD-10-CM

## 2024-02-28 DIAGNOSIS — I10 ESSENTIAL (PRIMARY) HYPERTENSION: ICD-10-CM

## 2024-02-28 PROCEDURE — 99214 OFFICE O/P EST MOD 30 MIN: CPT

## 2024-02-28 PROCEDURE — G2211 COMPLEX E/M VISIT ADD ON: CPT

## 2024-02-28 PROCEDURE — 93000 ELECTROCARDIOGRAM COMPLETE: CPT

## 2024-02-28 RX ORDER — VALSARTAN 160 MG/1
160 TABLET, COATED ORAL DAILY
Qty: 90 | Refills: 1 | Status: ACTIVE | COMMUNITY
Start: 2023-06-26 | End: 1900-01-01

## 2024-02-28 RX ORDER — DILTIAZEM HYDROCHLORIDE 120 MG/1
120 CAPSULE, EXTENDED RELEASE ORAL
Qty: 90 | Refills: 2 | Status: ACTIVE | COMMUNITY
Start: 2017-01-27 | End: 1900-01-01

## 2024-02-29 NOTE — PHYSICAL EXAM
[General Appearance - In No Acute Distress] : no acute distress [Eyelids - No Xanthelasma] : the eyelids demonstrated no xanthelasmas [Normal Conjunctiva] : the conjunctiva exhibited no abnormalities [Normal Oral Mucosa] : normal oral mucosa [No Oral Cyanosis] : no oral cyanosis [No Oral Pallor] : no oral pallor [Normal Oropharynx] : normal oropharynx [] : no respiratory distress [Respiration, Rhythm And Depth] : normal respiratory rhythm and effort [Exaggerated Use Of Accessory Muscles For Inspiration] : no accessory muscle use [Auscultation Breath Sounds / Voice Sounds] : lungs were clear to auscultation bilaterally [Heart Rate And Rhythm] : heart rate and rhythm were normal [Heart Sounds] : normal S1 and S2 [Arterial Pulses Normal] : the arterial pulses were normal [Systolic grade ___/6] : A grade [unfilled]/6 systolic murmur was heard. [Bowel Sounds] : normal bowel sounds [Abdomen Soft] : soft [Abdomen Tenderness] : non-tender [Nail Clubbing] : no clubbing of the fingernails [Cyanosis, Localized] : no localized cyanosis [Skin Color & Pigmentation] : normal skin color and pigmentation [No Skin Ulcers] : no skin ulcer [No Venous Stasis] : no venous stasis [Oriented To Time, Place, And Person] : oriented to person, place, and time [No Xanthoma] : no  xanthoma was observed [Affect] : the affect was normal [Impaired Insight] : insight and judgment were intact [Mood] : the mood was normal [FreeTextEntry1] : diffuse skeletal and joint deformities

## 2024-02-29 NOTE — REASON FOR VISIT
[Other: ____] : [unfilled] [Spouse] : spouse [FreeTextEntry1] : February 2024 - Patient returns today for follow-up in her usual state of health.  Shortly after Thanksgiving, she developed pneumonia. She was very weak and could not walk up the stairs. She was treated with cefuroxime by Dr. Hu. She was not hospitalized. There were no associated palpitations.

## 2024-02-29 NOTE — HISTORY OF PRESENT ILLNESS
[FreeTextEntry1] : Patient is a 74 year-old woman with a history of arthrogryposis, multiple joint deformities for which she had pediatric surgeries including blood transfusions from which she contracted HCV. Her orthopedic condition has resulted in kyphoscoliosis and restrictive lung disease. She was recently admitted to [facility] with severe pneumonia and in the setting of the pneumonia, she experienced paroxysms of AFib with RVR and associated chest discomfort. She was started on apixaban for anticoagulation and diltiazem for rate and rhythm control. Neither beta-blockers nor amiodarone would have been ideal choices given her restrictive airway disease. She was discharged home on those medications with new home oxygen and CPAP at night, and she completed three weeks of CardioNet monitor that was put on prior to discharge. There was no evidence of atrial fibrillation on the monitor, and she has remained symptom free.  Hospitalized in [place], [place] recently with small bowel obstruction that corrected with NG tube decompression. She never went back into AFib. No surgical intervention was necessary. She has since been seen by her GI (Shar Givens MD) and colorectal surgeon (Hari Gonzalez MD) who agree that nothing further needs to be done at this time.  EKG today shows normal sinus rhythm.  12/10/2018 - Patient had an upper airway infection over the Thanksgiving Holiday. She was prescribed doxycycline, which expedited her improvement in symptoms. No further cough.  June 2019 - Patient wore a 24 hour blood pressure monitor and was seen to be hypertensive. She was started on lisinopril 10 mg daily in addition to the diltiazem 120 mg daily. She was also started on Prolia. She had repeat blood work with her internist.   January 2020 - Patient returns today in her usual state of health. She continues on the antihypertensive regimen and finds her blood pressures are best when checked by Dr. Hu. She has no new complaints. EKG today shows sinus rhythm.  March 2021 - Patient returns today for follow-up. She is in her usual state of health. She and her  have each received both doses of the Pfizer Covid vaccine (second dose was 2/15/2021).   July 2021 - Patient returns today for follow-up in her usual state of health.  She is scheduled for a colonoscopy with Dr. Hari Gonzalez.  April 2022 - Patient returns today for follow-up in her usual state of health. She tolerated her colonoscopy well in late 2021. Dr. Hu has recommended she go for the second Covid-19 booster.  June 2023 - Patient returns today for follow-up. She has been noticing more swelling in her legs since increasing her diltiazem to 240 mg daily. She continues to take lisinopril 10 mg QHS.  PMD: Anitra Woodward MD [phone] Pulmonologist: Sujit Hu MD [phone] Hepatologist: Zachary Hoffmann MD [phone]

## 2024-02-29 NOTE — DISCUSSION/SUMMARY
[EKG obtained to assist in diagnosis and management of assessed problem(s)] : EKG obtained to assist in diagnosis and management of assessed problem(s) [FreeTextEntry1] : 74 year-old woman who developed PAF in the setting of severe PNA.  As there is no further evidence of atrial fibrillation, it is most likely that the episode was provoked in the setting of an acute pneumonia. No further anticoagulation at this time.  Continue diltiazem for now for rate and rhythm control - also has benefits as antihypertensive, although patient reports significant white coat hypertension. Unless patient develops symptoms of dizziness, fatigue (other than that of recovering from PNA), or bradycardia, would plan to continue diltiazem indefinitely to reduce likelihood of future PAF. Patient is not ideal candidate for beta-blocker or amiodarone because of restrictive lung disease. Reduce diltiazem from 240 mg daily to 120 mg daily due to swelling in the legs.  Will change from lisinopril 10 mg daily to valsartan 80 mg daily. Will uptitrate ARB as needed for blood pressure.   Follow-up with Dr. Hu as scheduled.   For patient with new LBBB, will check echocardiogram.

## 2024-02-29 NOTE — CARDIOLOGY SUMMARY
[de-identified] : 1/11/2017, hyperdynamic LV, concentric remodeling, and PASP 37 mmHg consistent with borderline pulmonary hypertension LVEF 75%.  12/14/2018, normal LA size, normal LV systolic function, normal RV systolic function, LVEF 70%  6/27/2023 mild-moderate aortic regurgitation, normal RV size and function, hyperdynamic LV systolic function, LVEF 70-75% [de-identified] : 1/10/2017, sinus 81 bpm with short ND interval and voltage criteria for LVH without repolarization abnormality  1/11/2017, atrial fibrillation at 160 bpm, LVH with lateral ST depressions

## 2024-03-07 ENCOUNTER — TRANSCRIPTION ENCOUNTER (OUTPATIENT)
Age: 74
End: 2024-03-07

## 2024-03-28 NOTE — DIETITIAN INITIAL EVALUATION ADULT. - NS AS NUTRI INTERV MEALS SNACK
sent home w/ family/friend Recommend to change diet to regular, lactose free./General/healthful diet

## 2024-07-27 NOTE — HISTORY OF PRESENT ILLNESS
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 Her orthopedic condition has resulted in kyphoscoliosis and restrictive lung disease. She was recently admitted to [facility] with severe pneumonia and in the setting of the pneumonia, she experienced paroxysms of AFib with RVR and associated chest discomfort. She was started on apixaban for anticoagulation and diltiazem for rate and rhythm control. Neither beta-blockers nor amiodarone would have been ideal choices given her restrictive airway disease. She was discharged home on those medications with new home oxygen and CPAP at night, and she completed three weeks of CardioNet monitor that was put on prior to discharge.\par There was no evidence of atrial fibrillation on the monitor, and she has remained symptom free.\par \par Hospitalized in [place], [place] recently with small bowel obstruction that corrected with NG tube decompression. She never went back into AFib. No surgical intervention was necessary. She has since been seen by her GI (Shar Givens MD) and colorectal surgeon (Hari Gonzalez MD) who agree that nothing further needs to be done at this time.\par \par EKG today shows normal sinus rhythm.\par \par 12/10/2018 - Patient had an upper airway infection over the Thanksgiving Holiday. She was prescribed doxycycline, which expedited her improvement in symptoms. No further cough.\par \par June 2019 - Patient wore a 24 hour blood pressure monitor and was seen to be hypertensive. She was started on lisinopril 10 mg daily in addition to the diltiazem 120 mg daily. She was also started on Prolia.\par She had repeat blood work with her internist. \par \par January 2020 - Patient returns today in her usual state of health. She continues on the antihypertensive regimen and finds her blood pressures are best when checked by Dr. Hu. She has no new complaints. EKG today shows sinus rhythm.\par \par PMD: Anitra Woodward MD [phone]\par Pulmonologist: Sujit Hu MD [phone]\par Hepatologist: Zachary Hoffmann MD [phone] [Nl] : Genitourinary [FreeTextEntry9] : see HPI [de-identified] : see HPI [de-identified] : SEE HPI [de-identified] : SEE HPI

## 2024-08-21 ENCOUNTER — RX RENEWAL (OUTPATIENT)
Age: 74
End: 2024-08-21

## 2024-08-28 ENCOUNTER — APPOINTMENT (OUTPATIENT)
Dept: CARDIOLOGY | Facility: CLINIC | Age: 74
End: 2024-08-28

## 2024-08-28 NOTE — PHYSICAL THERAPY INITIAL EVALUATION ADULT - ASSISTIVE DEVICE FOR TRANSFER: SIT/STAND, REHAB EVAL
Patient: Susan Patiño  : 3/29/1926    Encounter Date: 2024    Subjective- resident seen for follow up. She is lying in her room. Appetite is fair. No falls.  Ros-  Gen- weakness+  Chest-no pain or palpitations  Resp- chronic SOB +  Abdomen-no constipation No nausea/vomiting or diarrhea  Muskuloskeletal-no pain  Psych-no confusion  Vital signs-/96 T 97.7 P 63 Wt 121.8  General-alert, no acute distress  Neck-supple, non tender  Chest-clear breath sounds bilaterally  Cardiovascular-S1S2 regular  Abdomen-bowel sounds present, soft, nontender  Extremities- trace edema  Psych-normal mood to affect  Assessment and plan-  Chronic COPD-stable  c/w O2  CHF- stable  c/w lasix  HTN- stable  c/w lisinopril  A fib- c/w metoprolol, apixaban  HLD-c/w lipitor  OA- c/w tylenol prn  CKD-stable  Weakness- c/w supportive care and fall precautions.      Electronically Signed By: Rajni Acosta MD   24 11:40 AM  
rolling walker

## 2024-09-04 ENCOUNTER — APPOINTMENT (OUTPATIENT)
Dept: CARDIOLOGY | Facility: CLINIC | Age: 74
End: 2024-09-04
Payer: MEDICARE

## 2024-09-04 ENCOUNTER — NON-APPOINTMENT (OUTPATIENT)
Age: 74
End: 2024-09-04

## 2024-09-04 VITALS
OXYGEN SATURATION: 98 % | HEART RATE: 67 BPM | WEIGHT: 68 LBS | BODY MASS INDEX: 14.21 KG/M2 | SYSTOLIC BLOOD PRESSURE: 103 MMHG | DIASTOLIC BLOOD PRESSURE: 62 MMHG

## 2024-09-04 DIAGNOSIS — I10 ESSENTIAL (PRIMARY) HYPERTENSION: ICD-10-CM

## 2024-09-04 DIAGNOSIS — E87.5 HYPERKALEMIA: ICD-10-CM

## 2024-09-04 PROCEDURE — G2211 COMPLEX E/M VISIT ADD ON: CPT

## 2024-09-04 PROCEDURE — 93000 ELECTROCARDIOGRAM COMPLETE: CPT

## 2024-09-04 PROCEDURE — 99214 OFFICE O/P EST MOD 30 MIN: CPT

## 2024-09-08 NOTE — PHYSICAL EXAM
[General Appearance - In No Acute Distress] : no acute distress [Normal Conjunctiva] : the conjunctiva exhibited no abnormalities [Eyelids - No Xanthelasma] : the eyelids demonstrated no xanthelasmas [Normal Oral Mucosa] : normal oral mucosa [No Oral Pallor] : no oral pallor [No Oral Cyanosis] : no oral cyanosis [Normal Oropharynx] : normal oropharynx [] : no respiratory distress [Respiration, Rhythm And Depth] : normal respiratory rhythm and effort [Exaggerated Use Of Accessory Muscles For Inspiration] : no accessory muscle use [Auscultation Breath Sounds / Voice Sounds] : lungs were clear to auscultation bilaterally [Heart Sounds] : normal S1 and S2 [Heart Rate And Rhythm] : heart rate and rhythm were normal [Arterial Pulses Normal] : the arterial pulses were normal [Systolic grade ___/6] : A grade [unfilled]/6 systolic murmur was heard. [Bowel Sounds] : normal bowel sounds [Abdomen Soft] : soft [Abdomen Tenderness] : non-tender [Nail Clubbing] : no clubbing of the fingernails [Cyanosis, Localized] : no localized cyanosis [Skin Color & Pigmentation] : normal skin color and pigmentation [No Venous Stasis] : no venous stasis [No Skin Ulcers] : no skin ulcer [No Xanthoma] : no  xanthoma was observed [Oriented To Time, Place, And Person] : oriented to person, place, and time [Impaired Insight] : insight and judgment were intact [Affect] : the affect was normal [Mood] : the mood was normal [FreeTextEntry1] : diffuse skeletal and joint deformities

## 2024-09-08 NOTE — CARDIOLOGY SUMMARY
[de-identified] : 1/10/2017, sinus 81 bpm with short LA interval and voltage criteria for LVH without repolarization abnormality  1/11/2017, atrial fibrillation at 160 bpm, LVH with lateral ST depressions [de-identified] : 1/11/2017, hyperdynamic LV, concentric remodeling, and PASP 37 mmHg consistent with borderline pulmonary hypertension LVEF 75%.  12/14/2018, normal LA size, normal LV systolic function, normal RV systolic function, LVEF 70%  6/27/2023 mild-moderate aortic regurgitation, normal RV size and function, hyperdynamic LV systolic function, LVEF 70-75%

## 2024-09-08 NOTE — END OF VISIT
[Time Spent: ___ minutes] : I have spent [unfilled] minutes of time on the encounter which excludes teaching and separately reported services. [FreeTextEntry3] : I, Zachary Mccarthy MD, personally performed the evaluation and management (E/M) services for this established patient who presents today with (a) new problem(s)/exacerbation of (an) existing condition(s). That E/M includes conducting the clinically appropriate interval history &/or exam, assessing all new/exacerbated conditions, and establishing a new plan of care. Today, Padma Thomas NP was here to observe my evaluation and management service for this new problem/exacerbated condition and follow the plan of care established by me going forward.

## 2024-09-08 NOTE — CARDIOLOGY SUMMARY
[de-identified] : 1/10/2017, sinus 81 bpm with short NC interval and voltage criteria for LVH without repolarization abnormality  1/11/2017, atrial fibrillation at 160 bpm, LVH with lateral ST depressions [de-identified] : 1/11/2017, hyperdynamic LV, concentric remodeling, and PASP 37 mmHg consistent with borderline pulmonary hypertension LVEF 75%.  12/14/2018, normal LA size, normal LV systolic function, normal RV systolic function, LVEF 70%  6/27/2023 mild-moderate aortic regurgitation, normal RV size and function, hyperdynamic LV systolic function, LVEF 70-75%

## 2024-09-08 NOTE — REASON FOR VISIT
[Other: ____] : [unfilled] [Spouse] : spouse [FreeTextEntry1] : 9/4/2024 In July she was admitted to [facility] for hypercapnic respiratory failure. Her symptoms resolved on Bipap and she was discharged home 1.5 days later. Her concern today is for an episode of elevated blood glucose per routine labs with her PMD as serum glucose per BMP was 288. Subsequently she had an A1c checked which was 5.7% and spot glucose per follow up BMP was 118. Otherwise she is seen today in her usual state of health. There have been no interval changes to her medications.

## 2024-09-08 NOTE — DISCUSSION/SUMMARY
[FreeTextEntry1] : 74 year-old woman who developed PAF in the setting of severe PNA.  As there is no further evidence of atrial fibrillation, it is most likely that the episode was provoked in the setting of an acute pneumonia. No further anticoagulation at this time.  Continue diltiazem for now for rate and rhythm control - also has benefits as antihypertensive, although patient reports significant white coat hypertension. Unless patient develops symptoms of dizziness, fatigue (other than that of recovering from PNA), or bradycardia, would plan to continue diltiazem indefinitely to reduce likelihood of future PAF. Patient is not ideal candidate for beta-blocker or amiodarone because of restrictive lung disease. Reduced diltiazem from 240 mg daily to 120 mg daily due to swelling in the legs.  Continue valsartan 160 mg daily.  Follow-up with Dr. Hu as scheduled.  Follow up here in 6 months, sooner if necessary.

## 2024-09-08 NOTE — HISTORY OF PRESENT ILLNESS
[FreeTextEntry1] : Patient is a 74 year-old woman with a history of arthrogryposis, multiple joint deformities for which she had pediatric surgeries including blood transfusions from which she contracted HCV. Her orthopedic condition has resulted in kyphoscoliosis and restrictive lung disease. She was recently admitted to [facility] with severe pneumonia and in the setting of the pneumonia, she experienced paroxysms of AFib with RVR and associated chest discomfort. She was started on apixaban for anticoagulation and diltiazem for rate and rhythm control. Neither beta-blockers nor amiodarone would have been ideal choices given her restrictive airway disease. She was discharged home on those medications with new home oxygen and CPAP at night, and she completed three weeks of CardioNet monitor that was put on prior to discharge. There was no evidence of atrial fibrillation on the monitor, and she has remained symptom free.  Hospitalized in [place], [place] recently with small bowel obstruction that corrected with NG tube decompression. She never went back into AFib. No surgical intervention was necessary. She has since been seen by her GI (Shar Givens MD) and colorectal surgeon (Hari Gonzalez MD) who agree that nothing further needs to be done at this time.  EKG today shows normal sinus rhythm.  12/10/2018 - Patient had an upper airway infection over the Thanksgiving Holiday. She was prescribed doxycycline, which expedited her improvement in symptoms. No further cough.  June 2019 - Patient wore a 24 hour blood pressure monitor and was seen to be hypertensive. She was started on lisinopril 10 mg daily in addition to the diltiazem 120 mg daily. She was also started on Prolia. She had repeat blood work with her internist.   January 2020 - Patient returns today in her usual state of health. She continues on the antihypertensive regimen and finds her blood pressures are best when checked by Dr. Hu. She has no new complaints. EKG today shows sinus rhythm.  March 2021 - Patient returns today for follow-up. She is in her usual state of health. She and her  have each received both doses of the Pfizer Covid vaccine (second dose was 2/15/2021).   July 2021 - Patient returns today for follow-up in her usual state of health.  She is scheduled for a colonoscopy with Dr. Hari Gonzalez.  April 2022 - Patient returns today for follow-up in her usual state of health. She tolerated her colonoscopy well in late 2021. Dr. Hu has recommended she go for the second Covid-19 booster.  June 2023 - Patient returns today for follow-up. She has been noticing more swelling in her legs since increasing her diltiazem to 240 mg daily. She continues to take lisinopril 10 mg QHS.  February 2024 - Patient returns today for follow-up in her usual state of health.  Shortly after Thanksgiving, she developed pneumonia. She was very weak and could not walk up the stairs. She was treated with cefuroxime by Dr. Hu. She was not hospitalized. There were no associated palpitations.   PMD: Anitra Woodward MD [phone] Pulmonologist: Sujit Hu MD [phone] Hepatologist: Zachary Hoffmann MD [phone]

## 2024-09-08 NOTE — HISTORY OF PRESENT ILLNESS
[FreeTextEntry1] : Patient is a 74 year-old woman with a history of arthrogryposis, multiple joint deformities for which she had pediatric surgeries including blood transfusions from which she contracted HCV. Her orthopedic condition has resulted in kyphoscoliosis and restrictive lung disease. She was recently admitted to [facility] with severe pneumonia and in the setting of the pneumonia, she experienced paroxysms of AFib with RVR and associated chest discomfort. She was started on apixaban for anticoagulation and diltiazem for rate and rhythm control. Neither beta-blockers nor amiodarone would have been ideal choices given her restrictive airway disease. She was discharged home on those medications with new home oxygen and CPAP at night, and she completed three weeks of CardioNet monitor that was put on prior to discharge. There was no evidence of atrial fibrillation on the monitor, and she has remained symptom free.  Hospitalized in [place], [place] recently with small bowel obstruction that corrected with NG tube decompression. She never went back into AFib. No surgical intervention was necessary. She has since been seen by her GI (Shar Givens MD) and colorectal surgeon (Hari Gonzalez MD) who agree that nothing further needs to be done at this time.  EKG today shows normal sinus rhythm.  12/10/2018 - Patient had an upper airway infection over the Thanksgiving Holiday. She was prescribed doxycycline, which expedited her improvement in symptoms. No further cough.  June 2019 - Patient wore a 24 hour blood pressure monitor and was seen to be hypertensive. She was started on lisinopril 10 mg daily in addition to the diltiazem 120 mg daily. She was also started on Prolia. She had repeat blood work with her internist.   January 2020 - Patient returns today in her usual state of health. She continues on the antihypertensive regimen and finds her blood pressures are best when checked by Dr. Hu. She has no new complaints. EKG today shows sinus rhythm.  March 2021 - Patient returns today for follow-up. She is in her usual state of health. She and her  have each received both doses of the Pfizer Covid vaccine (second dose was 2/15/2021).   July 2021 - Patient returns today for follow-up in her usual state of health.  She is scheduled for a colonoscopy with Dr. Hari Gonzalez.  April 2022 - Patient returns today for follow-up in her usual state of health. She tolerated her colonoscopy well in late 2021. Dr. Hu has recommended she go for the second Covid-19 booster.  June 2023 - Patient returns today for follow-up. She has been noticing more swelling in her legs since increasing her diltiazem to 240 mg daily. She continues to take lisinopril 10 mg QHS.  February 2024 - Patient returns today for follow-up in her usual state of health.  Shortly after Thanksgiving, she developed pneumonia. She was very weak and could not walk up the stairs. She was treated with cefuroxime by Dr. Hu. She was not hospitalized. There were no associated palpitations.   PMD: Anitra Woodward MD [phone] Pulmonologist: Sujit Hu MD [phone] Hepatologist: Zachary Hoffmann MD [phone]

## 2024-09-08 NOTE — CARDIOLOGY SUMMARY
[de-identified] : 1/10/2017, sinus 81 bpm with short MI interval and voltage criteria for LVH without repolarization abnormality  1/11/2017, atrial fibrillation at 160 bpm, LVH with lateral ST depressions [de-identified] : 1/11/2017, hyperdynamic LV, concentric remodeling, and PASP 37 mmHg consistent with borderline pulmonary hypertension LVEF 75%.  12/14/2018, normal LA size, normal LV systolic function, normal RV systolic function, LVEF 70%  6/27/2023 mild-moderate aortic regurgitation, normal RV size and function, hyperdynamic LV systolic function, LVEF 70-75%

## 2024-10-04 NOTE — HISTORY OF PRESENT ILLNESS
Additional Notes: 10/4/24 Recommend to use the sulfur bar [FreeTextEntry1] : Patient is a 74 year-old woman with a history of arthrogryposis, multiple joint deformities for which she had pediatric surgeries including blood transfusions from which she contracted HCV. Her orthopedic condition has resulted in kyphoscoliosis and restrictive lung disease. She was recently admitted to [facility] with severe pneumonia and in the setting of the pneumonia, she experienced paroxysms of AFib with RVR and associated chest discomfort. She was started on apixaban for anticoagulation and diltiazem for rate and rhythm control. Neither beta-blockers nor amiodarone would have been ideal choices given her restrictive airway disease. She was discharged home on those medications with new home oxygen and CPAP at night, and she completed three weeks of CardioNet monitor that was put on prior to discharge. There was no evidence of atrial fibrillation on the monitor, and she has remained symptom free.  Hospitalized in [place], [place] recently with small bowel obstruction that corrected with NG tube decompression. She never went back into AFib. No surgical intervention was necessary. She has since been seen by her GI (Shar Givens MD) and colorectal surgeon (Hari Gonzalez MD) who agree that nothing further needs to be done at this time.  EKG today shows normal sinus rhythm.  12/10/2018 - Patient had an upper airway infection over the Thanksgiving Holiday. She was prescribed doxycycline, which expedited her improvement in symptoms. No further cough.  June 2019 - Patient wore a 24 hour blood pressure monitor and was seen to be hypertensive. She was started on lisinopril 10 mg daily in addition to the diltiazem 120 mg daily. She was also started on Prolia. She had repeat blood work with her internist.   January 2020 - Patient returns today in her usual state of health. She continues on the antihypertensive regimen and finds her blood pressures are best when checked by Dr. Hu. She has no new complaints. EKG today shows sinus rhythm.  March 2021 - Patient returns today for follow-up. She is in her usual state of health. She and her  have each received both doses of the Pfizer Covid vaccine (second dose was 2/15/2021).   July 2021 - Patient returns today for follow-up in her usual state of health.  She is scheduled for a colonoscopy with Dr. Hari Gonzalez.  April 2022 - Patient returns today for follow-up in her usual state of health. She tolerated her colonoscopy well in late 2021. Dr. Hu has recommended she go for the second Covid-19 booster.  June 2023 - Patient returns today for follow-up. She has been noticing more swelling in her legs since increasing her diltiazem to 240 mg daily. She continues to take lisinopril 10 mg QHS.  February 2024 - Patient returns today for follow-up in her usual state of health.  Shortly after Thanksgiving, she developed pneumonia. She was very weak and could not walk up the stairs. She was treated with cefuroxime by Dr. Hu. She was not hospitalized. There were no associated palpitations.   PMD: Anitra Woodward MD [phone] Pulmonologist: Sujit Hu MD [phone] Hepatologist: Zachary Hoffmann MD [phone]  Render Risk Assessment In Note?: yes Detail Level: Generalized

## 2024-11-02 ENCOUNTER — RX RENEWAL (OUTPATIENT)
Age: 74
End: 2024-11-02

## 2024-12-24 PROBLEM — F10.90 ALCOHOL USE: Status: ACTIVE | Noted: 2017-09-21

## 2025-03-11 ENCOUNTER — APPOINTMENT (OUTPATIENT)
Dept: CARDIOLOGY | Facility: CLINIC | Age: 75
End: 2025-03-11

## 2025-03-11 ENCOUNTER — NON-APPOINTMENT (OUTPATIENT)
Age: 75
End: 2025-03-11

## 2025-03-11 VITALS
DIASTOLIC BLOOD PRESSURE: 78 MMHG | OXYGEN SATURATION: 96 % | HEIGHT: 58 IN | BODY MASS INDEX: 14.69 KG/M2 | HEART RATE: 69 BPM | SYSTOLIC BLOOD PRESSURE: 128 MMHG | WEIGHT: 70 LBS

## 2025-03-11 DIAGNOSIS — I10 ESSENTIAL (PRIMARY) HYPERTENSION: ICD-10-CM

## 2025-03-11 DIAGNOSIS — I48.0 PAROXYSMAL ATRIAL FIBRILLATION: ICD-10-CM

## 2025-03-11 PROCEDURE — 93000 ELECTROCARDIOGRAM COMPLETE: CPT

## 2025-03-11 PROCEDURE — 99214 OFFICE O/P EST MOD 30 MIN: CPT

## 2025-03-11 PROCEDURE — G2211 COMPLEX E/M VISIT ADD ON: CPT

## 2025-07-02 ENCOUNTER — APPOINTMENT (OUTPATIENT)
Dept: HEPATOLOGY | Facility: CLINIC | Age: 75
End: 2025-07-02

## 2025-07-02 VITALS
HEART RATE: 87 BPM | OXYGEN SATURATION: 83 % | DIASTOLIC BLOOD PRESSURE: 73 MMHG | RESPIRATION RATE: 15 BRPM | HEIGHT: 58 IN | SYSTOLIC BLOOD PRESSURE: 190 MMHG

## 2025-07-02 PROBLEM — K82.4 GALLBLADDER POLYP: Status: ACTIVE | Noted: 2025-07-02

## 2025-07-02 PROBLEM — K80.20 CALCULUS OF GALLBLADDER WITHOUT CHOLECYSTITIS WITHOUT OBSTRUCTION: Status: ACTIVE | Noted: 2025-07-02

## 2025-07-02 PROCEDURE — 99204 OFFICE O/P NEW MOD 45 MIN: CPT

## 2025-07-02 RX ORDER — SACCHAROMYCES BOULARDII 50 MG
CAPSULE ORAL DAILY
Refills: 0 | Status: ACTIVE | COMMUNITY

## 2025-07-02 RX ORDER — SIMETHICONE 125 MG
CAPSULE ORAL
Refills: 0 | Status: ACTIVE | COMMUNITY

## 2025-07-02 RX ORDER — LORATADINE 5 MG
17 TABLET,CHEWABLE ORAL
Refills: 0 | Status: ACTIVE | COMMUNITY

## 2025-07-02 RX ORDER — LATANOPROST/PF 0.005 %
0.01 DROPS OPHTHALMIC (EYE)
Refills: 0 | Status: ACTIVE | COMMUNITY

## 2025-07-03 LAB
ALBUMIN SERPL ELPH-MCNC: 4.1 G/DL
ALP BLD-CCNC: 99 U/L
ALT SERPL-CCNC: 18 U/L
ANION GAP SERPL CALC-SCNC: 10 MMOL/L
AST SERPL-CCNC: 21 U/L
BASOPHILS # BLD AUTO: 0.04 K/UL
BASOPHILS NFR BLD AUTO: 0.4 %
BILIRUB DIRECT SERPL-MCNC: 0.17 MG/DL
BILIRUB SERPL-MCNC: 0.3 MG/DL
BUN SERPL-MCNC: 31 MG/DL
CALCIUM SERPL-MCNC: 9.8 MG/DL
CERULOPLASMIN SERPL-MCNC: 29 MG/DL
CHLORIDE SERPL-SCNC: 94 MMOL/L
CO2 SERPL-SCNC: 35 MMOL/L
CREAT SERPL-MCNC: 0.66 MG/DL
DEPRECATED KAPPA LC FREE/LAMBDA SER: 1.45 RATIO
EGFRCR SERPLBLD CKD-EPI 2021: 91 ML/MIN/1.73M2
EOSINOPHIL # BLD AUTO: 0.38 K/UL
EOSINOPHIL NFR BLD AUTO: 3.9 %
FERRITIN SERPL-MCNC: 40 NG/ML
GGT SERPL-CCNC: 39 U/L
GLUCOSE SERPL-MCNC: 75 MG/DL
HCT VFR BLD CALC: 32.1 %
HEPATITIS A IGG ANTIBODY: REACTIVE
HGB BLD-MCNC: 9.5 G/DL
IGA SERPL-MCNC: 282 MG/DL
IGG SERPL-MCNC: 1479 MG/DL
IGM SERPL-MCNC: 362 MG/DL
IMM GRANULOCYTES NFR BLD AUTO: 0.5 %
INR PPP: 0.94 RATIO
IRON SATN MFR SERPL: 15 %
IRON SERPL-MCNC: 51 UG/DL
KAPPA LC CSF-MCNC: 3.34 MG/DL
KAPPA LC SERPL-MCNC: 4.84 MG/DL
LYMPHOCYTES # BLD AUTO: 1.12 K/UL
LYMPHOCYTES NFR BLD AUTO: 11.4 %
MAN DIFF?: NORMAL
MCHC RBC-ENTMCNC: 29.6 G/DL
MCHC RBC-ENTMCNC: 30 PG
MCV RBC AUTO: 101.3 FL
MONOCYTES # BLD AUTO: 1.1 K/UL
MONOCYTES NFR BLD AUTO: 11.2 %
NEUTROPHILS # BLD AUTO: 7.14 K/UL
NEUTROPHILS NFR BLD AUTO: 72.6 %
PLATELET # BLD AUTO: 219 K/UL
POTASSIUM SERPL-SCNC: 4.5 MMOL/L
PROT SERPL-MCNC: 7.7 G/DL
PT BLD: 11.1 SEC
RBC # BLD: 3.17 M/UL
RBC # FLD: 14 %
SODIUM SERPL-SCNC: 139 MMOL/L
TIBC SERPL-MCNC: 338 UG/DL
UIBC SERPL-MCNC: 287 UG/DL
WBC # FLD AUTO: 9.83 K/UL

## 2025-07-06 LAB
MITOCHONDRIA AB SER IF-ACNC: ABNORMAL
SMOOTH MUSCLE AB SER QL IF: NORMAL

## 2025-07-07 LAB
A1AT PHENOTYP SERPL-IMP: NORMAL
A1AT SERPL-MCNC: 180 MG/DL
ANA TITR SER: ABNORMAL
ANA TITR SER: ABNORMAL

## 2025-07-08 LAB — SOLUBLE LIVER IGG SER IA-ACNC: 1.8

## 2025-07-16 PROBLEM — R74.8 ELEVATED LIVER ENZYMES: Status: ACTIVE | Noted: 2025-07-02

## 2025-08-12 ENCOUNTER — RX RENEWAL (OUTPATIENT)
Age: 75
End: 2025-08-12

## 2025-09-09 ENCOUNTER — APPOINTMENT (OUTPATIENT)
Dept: CARDIOLOGY | Facility: CLINIC | Age: 75
End: 2025-09-09
Payer: COMMERCIAL

## 2025-09-09 VITALS
BODY MASS INDEX: 14.84 KG/M2 | OXYGEN SATURATION: 98 % | SYSTOLIC BLOOD PRESSURE: 160 MMHG | DIASTOLIC BLOOD PRESSURE: 70 MMHG | HEART RATE: 56 BPM | WEIGHT: 71 LBS

## 2025-09-09 DIAGNOSIS — I48.0 PAROXYSMAL ATRIAL FIBRILLATION: ICD-10-CM

## 2025-09-09 DIAGNOSIS — I10 ESSENTIAL (PRIMARY) HYPERTENSION: ICD-10-CM

## 2025-09-09 PROCEDURE — 99204 OFFICE O/P NEW MOD 45 MIN: CPT

## 2025-09-09 PROCEDURE — 93000 ELECTROCARDIOGRAM COMPLETE: CPT

## 2025-09-09 PROCEDURE — G2211 COMPLEX E/M VISIT ADD ON: CPT | Mod: NC

## 2025-09-13 ENCOUNTER — NON-APPOINTMENT (OUTPATIENT)
Age: 75
End: 2025-09-13